# Patient Record
Sex: FEMALE | Race: BLACK OR AFRICAN AMERICAN | Employment: PART TIME | URBAN - METROPOLITAN AREA
[De-identification: names, ages, dates, MRNs, and addresses within clinical notes are randomized per-mention and may not be internally consistent; named-entity substitution may affect disease eponyms.]

---

## 2021-04-05 ENCOUNTER — VIRTUAL VISIT (OUTPATIENT)
Dept: FAMILY MEDICINE CLINIC | Age: 61
End: 2021-04-05
Payer: MEDICAID

## 2021-04-05 DIAGNOSIS — M25.561 CHRONIC PAIN OF BOTH KNEES: Primary | ICD-10-CM

## 2021-04-05 DIAGNOSIS — M79.672 PAIN IN BOTH FEET: ICD-10-CM

## 2021-04-05 DIAGNOSIS — Z12.11 COLON CANCER SCREENING: ICD-10-CM

## 2021-04-05 DIAGNOSIS — Z12.31 ENCOUNTER FOR SCREENING MAMMOGRAM FOR MALIGNANT NEOPLASM OF BREAST: ICD-10-CM

## 2021-04-05 DIAGNOSIS — M25.562 CHRONIC PAIN OF BOTH KNEES: Primary | ICD-10-CM

## 2021-04-05 DIAGNOSIS — M79.671 PAIN IN BOTH FEET: ICD-10-CM

## 2021-04-05 DIAGNOSIS — G89.29 CHRONIC PAIN OF BOTH KNEES: Primary | ICD-10-CM

## 2021-04-05 PROCEDURE — 99213 OFFICE O/P EST LOW 20 MIN: CPT | Performed by: FAMILY MEDICINE

## 2021-04-05 RX ORDER — NAPROXEN 500 MG/1
TABLET ORAL
COMMUNITY
Start: 2021-02-23 | End: 2021-04-05 | Stop reason: ALTCHOICE

## 2021-04-05 RX ORDER — DICLOFENAC SODIUM 50 MG/1
50 TABLET, DELAYED RELEASE ORAL 2 TIMES DAILY
Qty: 60 TAB | Refills: 1 | Status: SHIPPED | OUTPATIENT
Start: 2021-04-05 | End: 2021-05-24 | Stop reason: SDUPTHER

## 2021-04-05 NOTE — LETTER
NOTIFICATION RETURN TO WORK 
 
4/5/2021 1:55 PM 
 
Ms. Chao Rosales [address] [place] [address] To Whom It May Concern: 
 
Chao Rosales is currently under the care of [address]. She will need to have a shorter walk to work because of her knee pain. If there are questions or concerns please have the patient contact our office. Sincerely, Brianda Keane MD

## 2021-05-07 ENCOUNTER — OFFICE VISIT (OUTPATIENT)
Dept: ORTHOPEDIC SURGERY | Age: 61
End: 2021-05-07
Payer: MEDICAID

## 2021-05-07 VITALS — WEIGHT: 220 LBS | HEIGHT: 61 IN | BODY MASS INDEX: 41.54 KG/M2

## 2021-05-07 DIAGNOSIS — M25.561 CHRONIC PAIN OF RIGHT KNEE: Primary | ICD-10-CM

## 2021-05-07 DIAGNOSIS — G89.29 CHRONIC PAIN OF RIGHT KNEE: Primary | ICD-10-CM

## 2021-05-07 DIAGNOSIS — M25.562 CHRONIC PAIN OF LEFT KNEE: ICD-10-CM

## 2021-05-07 DIAGNOSIS — G89.29 CHRONIC PAIN OF LEFT KNEE: ICD-10-CM

## 2021-05-07 DIAGNOSIS — M17.0 PRIMARY OSTEOARTHRITIS OF BOTH KNEES: ICD-10-CM

## 2021-05-07 PROCEDURE — 73564 X-RAY EXAM KNEE 4 OR MORE: CPT | Performed by: ORTHOPAEDIC SURGERY

## 2021-05-07 PROCEDURE — 99204 OFFICE O/P NEW MOD 45 MIN: CPT | Performed by: ORTHOPAEDIC SURGERY

## 2021-05-07 RX ORDER — ASPIRIN 300 MG
SUPPOSITORY, RECTAL RECTAL
COMMUNITY
End: 2022-04-11 | Stop reason: ALTCHOICE

## 2021-05-07 NOTE — PROGRESS NOTES
Patient: Linda Serrano                MRN: [id number]       SSN: [id number]  YOB: 1960        AGE: 63 y.o. SEX: female  Body mass index is 42.26 kg/m². PCP: Hawa Dawn MD  05/07/21    Shanna Smiley presents with bilateral knee pain worse on the left and on the right injections usually only last about 3 to 4 weeks and then they tend to attenuate. Gets pain at night pain with activities of daily living she stands on her feet most of the day for work up to 8 hours at a time but her boss does letter to set we will write her a note to confirm this as well night pain difficulties with stairs kneeling getting up and down from a chair the left knee is more painful than the right 1. She denies fevers or chills and otherwise has been feeling well    The examination at today she stands in bilateral varus worse on the left and on the right she is about 15 degrees on the left with a lateral thrust 4 degree fixed flexion deformity on the left 3 degrees on the right bends fairly well to 115 118 degrees both feet are warm well perfused she has truncal obesity her legs are actually quite accessible and her body mass index is at 42 today    Joint line tenderness in all 3 compartments worse medially and patellofemoral in a mild to moderate effusion but not severely so    X-rays today on 5/7/2021 AP tunnel lateral and skyline of both knees confirm severe end-stage arthritis really bilaterally.     Was a discussion regarding surgery today and I would recommended after about a 15 pound weight loss to have the satisfy the hospital BMI criteria under 40    Risks and benefits described including but not limited to infection DVT pulmonary embolism anesthetic complications blood loss requiring transfusion chronic pain instability implant longevity arthrofibrosis and also the need for bending and straightening knee on an hourly basis to avoid complications    Is been a pleasure to share in her care and I do look forward to helping her surgically    REVIEW OF SYSTEMS:      CON: negative  EYE: negative   ENT: negative  RESP: negative  GI:    negative   :  negative  MSK: Positive  A twelve point review of systems was completed, positives noted and all other systems were reviewed and are negative          Past Medical History:   Diagnosis Date    Arthritis     Hypercholesteremia     SOB (shortness of breath) on exertion        History reviewed. No pertinent family history. Current Outpatient Medications   Medication Sig Dispense Refill    Aspirin-Caffeine (BC Arthritis) 1,000-65 mg pwpk Take  by mouth.  diclofenac EC (VOLTAREN) 50 mg EC tablet Take 1 Tab by mouth two (2) times a day.  61 Tab 1       No Known Allergies    Past Surgical History:   Procedure Laterality Date    VA EXPLORATORY OF ABDOMEN      gallstone removal       Social History     Socioeconomic History    Marital status: UNKNOWN     Spouse name: Not on file    Number of children: Not on file    Years of education: Not on file    Highest education level: Not on file   Occupational History    Not on file   Social Needs    Financial resource strain: Not on file    Food insecurity     Worry: Not on file     Inability: Not on file    Transportation needs     Medical: Not on file     Non-medical: Not on file   Tobacco Use    Smoking status: Never Smoker    Smokeless tobacco: Never Used   Substance and Sexual Activity    Alcohol use: Never     Frequency: Never     Binge frequency: Never    Drug use: Never    Sexual activity: Not on file   Lifestyle    Physical activity     Days per week: Not on file     Minutes per session: Not on file    Stress: Not on file   Relationships    Social connections     Talks on phone: Not on file     Gets together: Not on file     Attends Mandaen service: Not on file     Active member of club or organization: Not on file     Attends meetings of clubs or organizations: Not on file     Relationship status: Not on file    Intimate partner violence     Fear of current or ex partner: Not on file     Emotionally abused: Not on file     Physically abused: Not on file     Forced sexual activity: Not on file   Other Topics Concern    Not on file   Social History Narrative    Not on file       Visit Vitals  Ht 5' 0.5\" (1.537 m)   Wt 220 lb (99.8 kg)   BMI 42.26 kg/m²         PHYSICAL EXAMINATION:  GENERAL: Alert and oriented x3, in no acute distress, well-developed, well-nourished, afebrile. HEART: No JVD. EYES: No scleral icterus   NECK: No significant lymphadenopathy   LUNGS: No respiratory compromise or indrawing  ABDOMEN: Soft, non-tender, non-distended. Electronically signed by:  Kathi Emanuel MD

## 2021-05-07 NOTE — LETTER
NOTIFICATION RETURN TO WORK / SCHOOL 
 
5/7/2021 3:57 PM 
 
Ms. Yohana Anderson [address] [phone] To Whom It May Concern: 
 
Yohana Anderson is currently under the care of [address] [place], [place] [address]otilia Holden. Due to Ms. Munroe's medical condition, she should be allowed to sit as needed and use the front entrance when coming to work. If there are questions or concerns please have the patient contact our office. Sincerely, Amanda Bills MD

## 2021-05-07 NOTE — LETTER
5/7/2021 3:47 PM 
 
Ms. Solomon Ba [address] [place] [address] To Whom It May Concern: 
 
Solomon Ba is currently under the care of [address] [place] [address]. Due to Ms. Munroe's medical condition, she should be allowed to sit as needed. If there are questions or concerns please have the patient contact our office. Sincerely, Jaime Mcrae MD

## 2021-05-24 DIAGNOSIS — G89.29 CHRONIC PAIN OF BOTH KNEES: ICD-10-CM

## 2021-05-24 DIAGNOSIS — M25.561 CHRONIC PAIN OF BOTH KNEES: ICD-10-CM

## 2021-05-24 DIAGNOSIS — M25.562 CHRONIC PAIN OF BOTH KNEES: ICD-10-CM

## 2021-05-24 RX ORDER — DICLOFENAC SODIUM 50 MG/1
50 TABLET, DELAYED RELEASE ORAL 2 TIMES DAILY
Qty: 60 TABLET | Refills: 1 | Status: SHIPPED | OUTPATIENT
Start: 2021-05-24 | End: 2021-07-27 | Stop reason: SDUPTHER

## 2021-05-24 NOTE — TELEPHONE ENCOUNTER
Patient called the office stating she only has two pills left of her pain medication and is asking if this can be sent in for her. Patient would like a return call once this has been addressed.

## 2021-05-25 ENCOUNTER — TELEPHONE (OUTPATIENT)
Dept: FAMILY MEDICINE CLINIC | Age: 61
End: 2021-05-25

## 2021-05-25 NOTE — TELEPHONE ENCOUNTER
Patient called again to check the status. Informed her that more than likely it will not be done today, but I would send another message. She said she needs it Thursday before she goes to work. Please return call to [phone].

## 2021-05-25 NOTE — TELEPHONE ENCOUNTER
Patient called to check on the status. I let her know Dr. Myesha Rodriguez had approved the note for 5/24, 5/25, and 5/26. This was written and attempted to be faxed to the # requested [phone], attn: Catie Francisco per the patient request. The fax # above is not working. I tried to call patient back to make her aware but no answer and voicemail is not set up.

## 2021-05-25 NOTE — TELEPHONE ENCOUNTER
Patient called stating her knees are hurting, she can barley stand, she did not go to work Monday 05/24/21 today 05/25/21 and she states she is not going in on Wed 05/26/21, until she get the medication in her system that was  prescribed for her on yesterday. Patient would like a work note for those days, she states she will return on Thursday 05/27/21. Please Fax work note [phone].

## 2021-07-27 ENCOUNTER — VIRTUAL VISIT (OUTPATIENT)
Dept: FAMILY MEDICINE CLINIC | Age: 61
End: 2021-07-27
Payer: MEDICAID

## 2021-07-27 ENCOUNTER — TELEPHONE (OUTPATIENT)
Dept: FAMILY MEDICINE CLINIC | Age: 61
End: 2021-07-27

## 2021-07-27 DIAGNOSIS — M25.562 CHRONIC PAIN OF BOTH KNEES: ICD-10-CM

## 2021-07-27 DIAGNOSIS — M25.561 CHRONIC PAIN OF BOTH KNEES: ICD-10-CM

## 2021-07-27 DIAGNOSIS — G89.29 CHRONIC PAIN OF BOTH KNEES: ICD-10-CM

## 2021-07-27 PROCEDURE — 99442 PR PHYS/QHP TELEPHONE EVALUATION 11-20 MIN: CPT | Performed by: FAMILY MEDICINE

## 2021-07-27 RX ORDER — DICLOFENAC SODIUM 50 MG/1
50 TABLET, DELAYED RELEASE ORAL 2 TIMES DAILY
Qty: 60 TABLET | Refills: 3 | Status: SHIPPED | OUTPATIENT
Start: 2021-07-27 | End: 2022-02-07 | Stop reason: SDUPTHER

## 2021-07-27 NOTE — PROGRESS NOTES
Erin Mitchell is a 63 y.o. female, evaluated via audio-only technology on 7/27/2021 for Knee Pain (8/10)  . Assessment & Plan:   Diagnoses and all orders for this visit:    1. Chronic pain of both knees  -     diclofenac EC (VOLTAREN) 50 mg EC tablet; Take 1 Tablet by mouth two (2) times a day. Work note written. 12  Subjective:       Prior to Admission medications    Medication Sig Start Date End Date Taking? Authorizing Provider   diclofenac EC (VOLTAREN) 50 mg EC tablet Take 1 Tablet by mouth two (2) times a day. 5/24/21   Heidi Roberto MD   Aspirin-Caffeine [facility] Arthritis) 1,000-65 mg pwpk Take  by mouth. Provider, Historical     There is no problem list on file for this patient. Past Medical History:   Diagnosis Date    Arthritis     Hypercholesteremia     SOB (shortness of breath) on exertion        Review of Systems   Constitutional: Negative for chills, fever, malaise/fatigue and weight loss. Eyes: Negative for blurred vision. Respiratory: Negative for cough, shortness of breath and wheezing. Cardiovascular: Negative for chest pain. Gastrointestinal: Negative for nausea and vomiting. Musculoskeletal: Positive for joint pain. Negative for myalgias. Skin: Negative for rash. Neurological: Negative for weakness. Psychiatric/Behavioral: Negative. No flowsheet data found. Erin Mitchell, who was evaluated through a patient-initiated, synchronous (real-time) audio only encounter, and/or her healthcare decision maker, is aware that it is a billable service, with coverage as determined by her insurance carrier. She provided verbal consent to proceed: Yes. She has not had a related appointment within my department in the past 7 days or scheduled within the next 24 hours.       Total Time: minutes: 11-20 minutes    Tahmina Orlando MD

## 2021-07-27 NOTE — LETTER
NOTIFICATION RETURN TO WORK     7/27/2021 1:16 PM    [facility] Marybeth Warren  [address] [facility] [address]  [address] [place] [id number]      To Whom It May Concern:    Marybeth Warren is currently under the care of [address]. She will return to work on 7/28/21. If there are questions or concerns please have the patient contact our office.         Sincerely,      Lilian Espana MD

## 2022-02-07 ENCOUNTER — OFFICE VISIT (OUTPATIENT)
Dept: FAMILY MEDICINE CLINIC | Age: 62
End: 2022-02-07
Payer: MEDICAID

## 2022-02-07 ENCOUNTER — HOSPITAL ENCOUNTER (OUTPATIENT)
Dept: LAB | Age: 62
Discharge: HOME | End: 2022-02-07

## 2022-02-07 VITALS
HEART RATE: 97 BPM | BODY MASS INDEX: 42.26 KG/M2 | OXYGEN SATURATION: 100 % | RESPIRATION RATE: 10 BRPM | SYSTOLIC BLOOD PRESSURE: 138 MMHG | WEIGHT: 220 LBS | DIASTOLIC BLOOD PRESSURE: 82 MMHG

## 2022-02-07 DIAGNOSIS — M79.671 PAIN IN BOTH FEET: ICD-10-CM

## 2022-02-07 DIAGNOSIS — M25.562 CHRONIC PAIN OF BOTH KNEES: Primary | ICD-10-CM

## 2022-02-07 DIAGNOSIS — M79.672 PAIN IN BOTH FEET: ICD-10-CM

## 2022-02-07 DIAGNOSIS — G89.29 CHRONIC PAIN OF BOTH KNEES: Primary | ICD-10-CM

## 2022-02-07 DIAGNOSIS — M25.561 CHRONIC PAIN OF BOTH KNEES: Primary | ICD-10-CM

## 2022-02-07 DIAGNOSIS — Z12.11 COLON CANCER SCREENING: ICD-10-CM

## 2022-02-07 LAB — XX-LABCORP SPECIMEN COL,LCBCF: NORMAL

## 2022-02-07 PROCEDURE — 99001 SPECIMEN HANDLING PT-LAB: CPT

## 2022-02-07 PROCEDURE — 99214 OFFICE O/P EST MOD 30 MIN: CPT | Performed by: FAMILY MEDICINE

## 2022-02-07 RX ORDER — DICLOFENAC SODIUM 50 MG/1
50 TABLET, DELAYED RELEASE ORAL 2 TIMES DAILY
Qty: 60 TABLET | Refills: 3 | Status: SHIPPED | OUTPATIENT
Start: 2022-02-07 | End: 2022-04-11 | Stop reason: ALTCHOICE

## 2022-02-07 RX ORDER — DICLOFENAC SODIUM 50 MG/1
50 TABLET, DELAYED RELEASE ORAL 2 TIMES DAILY
Qty: 60 TABLET | Refills: 3 | Status: CANCELLED | OUTPATIENT
Start: 2022-02-07

## 2022-02-07 NOTE — PATIENT INSTRUCTIONS
Colon Cancer Screening: Care Instructions  Overview     Colorectal cancer occurs in the colon or rectum. That's the lower part of your digestive system. It often starts in small growths called polyps in the colon or rectum. Polyps are usually found with screening tests. Depending on the type of test, any polyps found may be removed during the tests. Colorectal cancer usually does not cause symptoms at first. But regular tests can help find it early, before it spreads and becomes harder to treat. Your risk for colorectal cancer gets higher as you get older. Experts recommend starting screening at age 39 for people who are at average risk. Talk with your doctor about your risk and when to start and stop screening. You may have one of several tests. Follow-up care is a key part of your treatment and safety. Be sure to make and go to all appointments, and call your doctor if you are having problems. It's also a good idea to know your test results and keep a list of the medicines you take. What are the main screening tests for colon cancer? The screening tests are:  Stool tests. These include the guaiac fecal occult blood test (gFOBT), the fecal immunochemical test (FIT), and the combined fecal immunochemical test and stool DNA test (FIT-DNA). These tests check stool samples for signs of cancer. If your test is positive, you will need to have a colonoscopy. Sigmoidoscopy. This test lets your doctor look at the lining of your rectum and the lowest part of your colon. Your doctor uses a lighted tube called a sigmoidoscope. This test can't find cancers or polyps in the upper part of your colon. In some cases, polyps that are found can be removed. But if your doctor finds polyps, you will need to have a colonoscopy to check the upper part of your colon. Colonoscopy. This test lets your doctor look at the lining of your rectum and your entire colon. The doctor uses a thin, flexible tool called a colonoscope. It can also be used to remove polyps or get a tissue sample (biopsy). A less common test is CT colonography (CTC). It's also called virtual colonoscopy. Who should be screened for colorectal cancer? Your risk for colorectal cancer gets higher as you get older. Experts recommend starting screening at age 39 for people who are at average risk. Talk with your doctor about your risk and when to start and stop screening. How often you need screening depends on the type of test you get:  Stool tests. Every year for FIT or gFOBT. Every 1 to 3 years for sDNA, also called FIT-DNA. Tests that look inside the colon. Every 5 years for sigmoidoscopy. (If you do the FIT test every year, you can get this test every 10 years.)   Every 5 years for CT colonography (virtual colonoscopy). Every 10 years for colonoscopy. Experts agree that people at higher risk may need to be tested sooner and more often. This includes people who have a strong family history of colon cancer. Talk to your doctor about which test is best for you and when to be tested. When should you call for help? Watch closely for changes in your health, and be sure to contact your doctor if:    · You have any changes in your bowel habits.     · You have any problems. Where can you learn more? Go to http://www.gray.com/  Enter M541 in the search box to learn more about \"Colon Cancer Screening: Care Instructions. \"  Current as of: December 17, 2020               Content Version: 13.0  © 1456-6739 [facility]. Care instructions adapted under license by [employer] (which disclaims liability or warranty for this information). If you have questions about a medical condition or this instruction, always ask your healthcare professional. [place] [address] any warranty or liability for your use of this information.

## 2022-02-07 NOTE — PROGRESS NOTES
Gila Cortés is a 57 y.o. female  presents for joint pain. No injury or trauma. She has assoc swelling. No fever chills nausea or vomiting. sxs are getting worse. No Known Allergies  Outpatient Medications Marked as Taking for the 2/7/22 encounter (Office Visit) with Carolann Adamson MD   Medication Sig Dispense Refill    diclofenac EC (VOLTAREN) 50 mg EC tablet Take 1 Tablet by mouth two (2) times a day. 60 Tablet 3     There is no problem list on file for this patient. Past Medical History:   Diagnosis Date    Arthritis     Hypercholesteremia     SOB (shortness of breath) on exertion      Social History     Socioeconomic History    Marital status: UNKNOWN   Tobacco Use    Smoking status: Never Smoker    Smokeless tobacco: Never Used   Substance and Sexual Activity    Alcohol use: Never    Drug use: Never     No family history on file. Review of Systems   Constitutional: Negative for chills, fever, malaise/fatigue and weight loss. Eyes: Negative for blurred vision. Respiratory: Negative for cough, shortness of breath and wheezing. Cardiovascular: Negative for chest pain. Gastrointestinal: Negative for nausea and vomiting. Musculoskeletal: Positive for joint pain. Negative for myalgias. Skin: Negative for rash. Neurological: Negative for weakness. Psychiatric/Behavioral: Negative. Vitals:    02/07/22 1407   BP: 138/82   Pulse: 97   Resp: 10   SpO2: 100%   Weight: 220 lb (99.8 kg)   PainSc:  10 - Worst pain ever   PainLoc: Hand       Physical Exam  Vitals and nursing note reviewed. Constitutional:       Appearance: Normal appearance. She is obese. Neck:      Thyroid: No thyromegaly. Cardiovascular:      Rate and Rhythm: Normal rate and regular rhythm. Heart sounds: Normal heart sounds. Pulmonary:      Effort: Pulmonary effort is normal.      Breath sounds: Normal breath sounds. Musculoskeletal:         General: Swelling and tenderness present.  Normal range of motion. Cervical back: Normal range of motion and neck supple. Right lower leg: No edema. Left lower leg: No edema. Skin:     General: Skin is warm and dry. Neurological:      General: No focal deficit present. Mental Status: She is alert and oriented to person, place, and time. Psychiatric:         Mood and Affect: Mood normal.         Behavior: Behavior normal.         Thought Content: Thought content normal.         Judgment: Judgment normal.         Assessment/Plan      ICD-10-CM ICD-9-CM    1. Chronic pain of both knees  M25.561 719.46 diclofenac EC (VOLTAREN) 50 mg EC tablet    M25.562 338.29 VITAMIN D, 25 HYDROXY    X05.90  METABOLIC PANEL, COMPREHENSIVE      CBC WITH AUTOMATED DIFF      URIC ACID      RHEUMATOID FACTOR, QT   2. Colon cancer screening  Z12.11 V76.51 REFERRAL TO GASTROENTEROLOGY   3. Pain in both feet  M79.671 729.5 REFERRAL TO PODIATRY    F88.740        I have discussed the diagnosis with the patient and the intended plan of care as seen in the above orders. The patient has received an after-visit summary and questions were answered concerning future plans. I have discussed medication, side effects, and warnings with the patient in detail. The patient verbalized understanding and is in agreement with the plan of care. The patient will contact the office with any additional concerns.       lab results and schedule of future lab studies reviewed with patient    Ange Menjivar MD

## 2022-02-12 LAB
25(OH)D3+25(OH)D2 SERPL-MCNC: 11.5 NG/ML (ref 30–100)
ALBUMIN SERPL-MCNC: 4 G/DL (ref 3.8–4.8)
ALBUMIN/GLOB SERPL: 1.3 {RATIO} (ref 1.2–2.2)
ALP SERPL-CCNC: 106 IU/L (ref 44–121)
ALT SERPL-CCNC: 17 IU/L (ref 0–32)
AST SERPL-CCNC: 23 IU/L (ref 0–40)
BASOPHILS # BLD AUTO: 0 X10E3/UL (ref 0–0.2)
BASOPHILS NFR BLD AUTO: 0 %
BILIRUB SERPL-MCNC: 0.2 MG/DL (ref 0–1.2)
BUN SERPL-MCNC: 11 MG/DL (ref 8–27)
BUN/CREAT SERPL: 16 (ref 12–28)
CALCIUM SERPL-MCNC: 9.5 MG/DL (ref 8.7–10.3)
CHLORIDE SERPL-SCNC: 103 MMOL/L (ref 96–106)
CO2 SERPL-SCNC: 19 MMOL/L (ref 20–29)
CREAT SERPL-MCNC: 0.68 MG/DL (ref 0.57–1)
EOSINOPHIL # BLD AUTO: 0.2 X10E3/UL (ref 0–0.4)
EOSINOPHIL NFR BLD AUTO: 3 %
ERYTHROCYTE [DISTWIDTH] IN BLOOD BY AUTOMATED COUNT: 12.6 % (ref 11.7–15.4)
GLOBULIN SER CALC-MCNC: 3 G/DL (ref 1.5–4.5)
GLUCOSE SERPL-MCNC: 75 MG/DL (ref 65–99)
HCT VFR BLD AUTO: 40.7 % (ref 34–46.6)
HGB BLD-MCNC: 13.2 G/DL (ref 11.1–15.9)
IMM GRANULOCYTES # BLD AUTO: 0 X10E3/UL (ref 0–0.1)
IMM GRANULOCYTES NFR BLD AUTO: 0 %
LYMPHOCYTES # BLD AUTO: 2.4 X10E3/UL (ref 0.7–3.1)
LYMPHOCYTES NFR BLD AUTO: 36 %
MCH RBC QN AUTO: 27.2 PG (ref 26.6–33)
MCHC RBC AUTO-ENTMCNC: 32.4 G/DL (ref 31.5–35.7)
MCV RBC AUTO: 84 FL (ref 79–97)
MONOCYTES # BLD AUTO: 0.4 X10E3/UL (ref 0.1–0.9)
MONOCYTES NFR BLD AUTO: 5 %
NEUTROPHILS # BLD AUTO: 3.8 X10E3/UL (ref 1.4–7)
NEUTROPHILS NFR BLD AUTO: 56 %
PLATELET # BLD AUTO: 281 X10E3/UL (ref 150–450)
POTASSIUM SERPL-SCNC: 4.6 MMOL/L (ref 3.5–5.2)
PROT SERPL-MCNC: 7 G/DL (ref 6–8.5)
RBC # BLD AUTO: 4.85 X10E6/UL (ref 3.77–5.28)
RHEUMATOID FACT SERPL-ACNC: <10 IU/ML (ref 0–15)
SODIUM SERPL-SCNC: 140 MMOL/L (ref 134–144)
SPECIMEN STATUS REPORT, ROLRST: NORMAL
URATE SERPL-MCNC: 5.9 MG/DL (ref 3–7.2)
WBC # BLD AUTO: 6.8 X10E3/UL (ref 3.4–10.8)

## 2022-02-15 ENCOUNTER — VIRTUAL VISIT (OUTPATIENT)
Dept: FAMILY MEDICINE CLINIC | Age: 62
End: 2022-02-15
Payer: MEDICAID

## 2022-02-15 DIAGNOSIS — G89.29 CHRONIC PAIN OF BOTH KNEES: Primary | ICD-10-CM

## 2022-02-15 DIAGNOSIS — M25.562 CHRONIC PAIN OF BOTH KNEES: Primary | ICD-10-CM

## 2022-02-15 DIAGNOSIS — M25.561 CHRONIC PAIN OF BOTH KNEES: Primary | ICD-10-CM

## 2022-02-15 PROCEDURE — 99213 OFFICE O/P EST LOW 20 MIN: CPT | Performed by: FAMILY MEDICINE

## 2022-02-15 NOTE — LETTER
NOTIFICATION RETURN TO WORK / SCHOOL    2/15/2022 2:24 PM    [facility] Taz Melgar  [address] [facility] [address]  [address] [address]      To Whom It May Concern:    Taz Melgar is currently under the care of [address]. She was out of work on 2/14/22 to 2/16/22. She will return to work on 2/17/22. If there are questions or concerns please have the patient contact our office.         Sincerely,      Irena Deleon MD

## 2022-02-15 NOTE — PROGRESS NOTES
Chief Complaint   Patient presents with    Leg Pain    Leg Swelling     Swelling and pain in both legs. Describes the pain as sharp.

## 2022-02-15 NOTE — PROGRESS NOTES
Fabien Connolly is a 57 y.o. female who was seen by synchronous (real-time) audio-video technology on 2/15/2022 for Leg Pain (she has pain in both knees right is worse than left. No injury or trauma.  ) and Leg Swelling        Assessment & Plan:   Diagnoses and all orders for this visit:    1. Chronic pain of both knees      Will continue current meds and follow up in 2 months. Work note faxed. [phone]  Subjective:       Prior to Admission medications    Medication Sig Start Date End Date Taking? Authorizing Provider   diclofenac EC (VOLTAREN) 50 mg EC tablet Take 1 Tablet by mouth two (2) times a day. 2/7/22   Severiano Commander, MD   Aspirin-Caffeine [facility] [place] Arthritis) 1,000-65 mg pwpk Take  by mouth. Provider, Historical     There is no problem list on file for this patient. Past Medical History:   Diagnosis Date    Arthritis     Hypercholesteremia     SOB (shortness of breath) on exertion        Review of Systems   Constitutional: Negative for chills, fever, malaise/fatigue and weight loss. Eyes: Negative for blurred vision. Respiratory: Negative for cough, shortness of breath and wheezing. Cardiovascular: Negative for chest pain. Gastrointestinal: Negative for nausea and vomiting. Musculoskeletal: Positive for joint pain. Negative for myalgias. Skin: Negative for rash. Neurological: Negative for weakness. Psychiatric/Behavioral: Negative. Objective:   No flowsheet data found. General: alert, cooperative, no distress   Mental  status: normal mood, behavior, speech, dress, motor activity, and thought processes, able to follow commands   HENT: NCAT   Neck: no visualized mass   Resp: no respiratory distress   Neuro: no gross deficits   Skin: no discoloration or lesions of concern on visible areas   Psychiatric: normal affect, consistent with stated mood, no evidence of hallucinations     Additional exam findings:        We discussed the expected course, resolution and complications of the diagnosis(es) in detail. Medication risks, benefits, costs, interactions, and alternatives were discussed as indicated. I advised her to contact the office if her condition worsens, changes or fails to improve as anticipated. She expressed understanding with the diagnosis(es) and plan. [place] Schlatter, was evaluated through a synchronous (real-time) audio-video encounter. The patient (or guardian if applicable) is aware that this is a billable service, which includes applicable co-pays. Verbal consent to proceed has been obtained. The visit was conducted pursuant to the emergency declaration under the [facility], [address] [place] [address] [facility] authority and the [email or web address] and [employer] General Act. Patient identification was verified, and a caregiver was present when appropriate. The patient was located at home in a state where the provider was licensed to provide care.     Stephen Silveira MD

## 2022-02-16 DIAGNOSIS — E55.9 VITAMIN D DEFICIENCY: Primary | ICD-10-CM

## 2022-02-16 RX ORDER — ERGOCALCIFEROL 1.25 MG/1
50000 CAPSULE ORAL
Qty: 12 CAPSULE | Refills: 1 | Status: SHIPPED | OUTPATIENT
Start: 2022-02-16

## 2022-03-21 ENCOUNTER — OFFICE VISIT (OUTPATIENT)
Dept: FAMILY MEDICINE CLINIC | Age: 62
End: 2022-03-21
Payer: MEDICAID

## 2022-03-21 VITALS
HEART RATE: 79 BPM | SYSTOLIC BLOOD PRESSURE: 134 MMHG | RESPIRATION RATE: 8 BRPM | WEIGHT: 220 LBS | BODY MASS INDEX: 42.26 KG/M2 | OXYGEN SATURATION: 97 % | DIASTOLIC BLOOD PRESSURE: 78 MMHG

## 2022-03-21 DIAGNOSIS — M25.561 ACUTE PAIN OF RIGHT KNEE: Primary | ICD-10-CM

## 2022-03-21 PROCEDURE — 99213 OFFICE O/P EST LOW 20 MIN: CPT | Performed by: FAMILY MEDICINE

## 2022-03-21 NOTE — PROGRESS NOTES
Chief Complaint   Patient presents with    Leg Pain     Pt in office for bilat leg pain. States that right leg is worse and that it feels swollen behind her knee.

## 2022-03-21 NOTE — PATIENT INSTRUCTIONS
Knee Pain or Injury: Care Instructions  Your Care Instructions     Injuries are a common cause of knee problems. Sudden (acute) injuries may be caused by a direct blow to the knee. They can also be caused by abnormal twisting, bending, or falling on the knee. Pain, bruising, or swelling may be severe, and may start within minutes of the injury. Overuse is another cause of knee pain. Other causes are climbing stairs, kneeling, and other activities that use the knee. Everyday wear and tear, especially as you get older, also can cause knee pain. Rest, along with home treatment, often relieves pain and allows your knee to heal. If you have a serious knee injury, you may need tests and treatment. Follow-up care is a key part of your treatment and safety. Be sure to make and go to all appointments, and call your doctor if you are having problems. It's also a good idea to know your test results and keep a list of the medicines you take. How can you care for yourself at home? · Be safe with medicines. Read and follow all instructions on the label. ? If the doctor gave you a prescription medicine for pain, take it as prescribed. ? If you are not taking a prescription pain medicine, ask your doctor if you can take an over-the-counter medicine. · Rest and protect your knee. Take a break from any activity that may cause pain. · Put ice or a cold pack on your knee for 10 to 20 minutes at a time. Put a thin cloth between the ice and your skin. · Prop up a sore knee on a pillow when you ice it or anytime you sit or lie down for the next 3 days. Try to keep it above the level of your heart. This will help reduce swelling. · If your knee is not swollen, you can put moist heat, a heating pad, or a warm cloth on your knee. · If your doctor recommends an elastic bandage, sleeve, or other type of support for your knee, wear it as directed.   · Follow your doctor's instructions about how much weight you can put on your leg. Use a cane, crutches, or a walker as instructed. · Follow your doctor's instructions about activity during your healing process. If you can do mild exercise, slowly increase your activity. · Reach and stay at a healthy weight. Extra weight can strain the joints, especially the knees and hips, and make the pain worse. Losing even a few pounds may help. When should you call for help? Call 911 anytime you think you may need emergency care. For example, call if:    · You have symptoms of a blood clot in your lung (called a pulmonary embolism). These may include:  ? Sudden chest pain. ? Trouble breathing. ? Coughing up blood. Call your doctor now or seek immediate medical care if:    · You have severe or increasing pain.     · Your leg or foot turns cold or changes color.     · You cannot stand or put weight on your knee.     · Your knee looks twisted or bent out of shape.     · You cannot move your knee.     · You have signs of infection, such as:  ? Increased pain, swelling, warmth, or redness. ? Red streaks leading from the knee. ? Pus draining from a place on your knee. ? A fever.     · You have signs of a blood clot in your leg (called a deep vein thrombosis), such as:  ? Pain in your calf, back of the knee, thigh, or groin. ? Redness and swelling in your leg or groin. Watch closely for changes in your health, and be sure to contact your doctor if:    · You have tingling, weakness, or numbness in your knee.     · You have any new symptoms, such as swelling.     · You have bruises from a knee injury that last longer than 2 weeks.     · You do not get better as expected. Where can you learn more? Go to http://www.gray.com/  Enter K195 in the search box to learn more about \"Knee Pain or Injury: Care Instructions. \"  Current as of: July 1, 2021               Content Version: 13.2  © 4186-0740 Healthwise, [employer].    Care instructions adapted under license by [employer] (which disclaims liability or warranty for this information). If you have questions about a medical condition or this instruction, always ask your healthcare professional. [address] any warranty or liability for your use of this information.

## 2022-03-21 NOTE — PROGRESS NOTES
Deette Opitz is a 57 y.o. female  presents for right knee pain. She has had it for several days. It is not getting worse. No history of injury or trauma. Pain radiates to back of knee. No Known Allergies    There is no problem list on file for this patient. Past Medical History:   Diagnosis Date    Arthritis     Hypercholesteremia     SOB (shortness of breath) on exertion      Social History     Socioeconomic History    Marital status: UNKNOWN   Tobacco Use    Smoking status: Never Smoker    Smokeless tobacco: Never Used   Substance and Sexual Activity    Alcohol use: Never    Drug use: Never     No family history on file. Review of Systems   Constitutional: Negative for chills, fever, malaise/fatigue and weight loss. Eyes: Negative for blurred vision. Respiratory: Negative for cough, shortness of breath and wheezing. Cardiovascular: Negative for chest pain. Gastrointestinal: Negative for nausea and vomiting. Musculoskeletal: Positive for joint pain. Negative for myalgias. Skin: Negative for rash. Neurological: Negative for weakness. Vitals:    03/21/22 1529   BP: 134/78   Pulse: 79   Resp: 8   SpO2: 97%   Weight: 220 lb (99.8 kg)   PainSc:  10 - Worst pain ever   PainLoc: Leg       Physical Exam  Vitals and nursing note reviewed. Constitutional:       Appearance: Normal appearance. She is obese. Neck:      Thyroid: No thyromegaly. Cardiovascular:      Rate and Rhythm: Normal rate and regular rhythm. Pulses: Normal pulses. Heart sounds: Normal heart sounds. Pulmonary:      Effort: Pulmonary effort is normal.      Breath sounds: Normal breath sounds. Musculoskeletal:         General: Tenderness present. No swelling, deformity or signs of injury. Normal range of motion. Cervical back: Normal range of motion and neck supple. Right lower leg: No edema. Left lower leg: No edema. Skin:     General: Skin is warm and dry.    Neurological: General: No focal deficit present. Mental Status: She is alert and oriented to person, place, and time. Assessment/Plan      ICD-10-CM ICD-9-CM    1. Acute pain of right knee  M25.561 719.46 US EXT NONVAS RT LTD     Will continue current voltaren    I have discussed the diagnosis with the patient and the intended plan of care as seen in the above orders. The patient has received an after-visit summary and questions were answered concerning future plans. I have discussed medication, side effects, and warnings with the patient in detail. The patient verbalized understanding and is in agreement with the plan of care. The patient will contact the office with any additional concerns.       Follow-up and Dispositions    · Return if symptoms worsen or fail to improve.       lab results and schedule of future lab studies reviewed with patient    Alex Owens MD

## 2022-03-23 ENCOUNTER — TELEPHONE (OUTPATIENT)
Dept: FAMILY MEDICINE CLINIC | Age: 62
End: 2022-03-23

## 2022-03-23 DIAGNOSIS — R22.41 KNEE MASS, RIGHT: Primary | ICD-10-CM

## 2022-03-23 DIAGNOSIS — M25.561 ACUTE PAIN OF RIGHT KNEE: ICD-10-CM

## 2022-04-08 ENCOUNTER — HOSPITAL ENCOUNTER (OUTPATIENT)
Dept: ULTRASOUND IMAGING | Age: 62
Discharge: HOME | End: 2022-04-08
Attending: FAMILY MEDICINE
Payer: MEDICAID

## 2022-04-08 DIAGNOSIS — M25.561 ACUTE PAIN OF RIGHT KNEE: ICD-10-CM

## 2022-04-08 DIAGNOSIS — R22.41 KNEE MASS, RIGHT: ICD-10-CM

## 2022-04-08 PROCEDURE — 76882 US LMTD JT/FCL EVL NVASC XTR: CPT

## 2022-04-11 ENCOUNTER — TELEPHONE (OUTPATIENT)
Dept: FAMILY MEDICINE CLINIC | Age: 62
End: 2022-04-11

## 2022-04-11 DIAGNOSIS — M25.562 CHRONIC PAIN OF BOTH KNEES: ICD-10-CM

## 2022-04-11 DIAGNOSIS — M25.561 CHRONIC PAIN OF BOTH KNEES: ICD-10-CM

## 2022-04-11 DIAGNOSIS — R22.41 KNEE MASS, RIGHT: Primary | ICD-10-CM

## 2022-04-11 DIAGNOSIS — M25.561 ACUTE PAIN OF RIGHT KNEE: ICD-10-CM

## 2022-04-11 DIAGNOSIS — G89.29 CHRONIC PAIN OF BOTH KNEES: ICD-10-CM

## 2022-04-11 DIAGNOSIS — M25.569 POSTERIOR KNEE PAIN, UNSPECIFIED LATERALITY: ICD-10-CM

## 2022-04-11 RX ORDER — MELOXICAM 15 MG/1
15 TABLET ORAL DAILY
Qty: 30 TABLET | Refills: 0 | Status: SHIPPED | OUTPATIENT
Start: 2022-04-11 | End: 2022-05-09 | Stop reason: SDUPTHER

## 2022-04-11 NOTE — TELEPHONE ENCOUNTER
Called pt back and she is aware that mobic 15 mg has been sent in to the pharmacy. She is also aware of referral to ortho. Advised her to contact us if she did not hear from DONALD w/in the week. Pt expressed clear understanding and had no further questions.

## 2022-05-09 ENCOUNTER — OFFICE VISIT (OUTPATIENT)
Dept: FAMILY MEDICINE CLINIC | Age: 62
End: 2022-05-09
Payer: MEDICAID

## 2022-05-09 VITALS
OXYGEN SATURATION: 98 % | WEIGHT: 220 LBS | TEMPERATURE: 97.8 F | HEART RATE: 68 BPM | SYSTOLIC BLOOD PRESSURE: 124 MMHG | DIASTOLIC BLOOD PRESSURE: 78 MMHG | BODY MASS INDEX: 42.26 KG/M2 | RESPIRATION RATE: 14 BRPM

## 2022-05-09 DIAGNOSIS — M25.561 CHRONIC PAIN OF BOTH KNEES: Primary | ICD-10-CM

## 2022-05-09 DIAGNOSIS — G89.29 CHRONIC PAIN OF BOTH KNEES: Primary | ICD-10-CM

## 2022-05-09 DIAGNOSIS — M25.562 CHRONIC PAIN OF BOTH KNEES: Primary | ICD-10-CM

## 2022-05-09 PROCEDURE — 99213 OFFICE O/P EST LOW 20 MIN: CPT | Performed by: FAMILY MEDICINE

## 2022-05-09 RX ORDER — LIDOCAINE 4 G/100G
1 PATCH TOPICAL EVERY 12 HOURS
Qty: 30 PATCH | Refills: 2 | Status: SHIPPED
Start: 2022-05-09 | End: 2022-05-23

## 2022-05-09 RX ORDER — MELOXICAM 15 MG/1
15 TABLET ORAL DAILY
Qty: 30 TABLET | Refills: 0 | Status: SHIPPED | OUTPATIENT
Start: 2022-05-09 | End: 2022-10-04 | Stop reason: SDUPTHER

## 2022-05-09 NOTE — PATIENT INSTRUCTIONS
Knee Pain or Injury: Care Instructions  Your Care Instructions     Injuries are a common cause of knee problems. Sudden (acute) injuries may be caused by a direct blow to the knee. They can also be caused by abnormal twisting, bending, or falling on the knee. Pain, bruising, or swelling may be severe, and may start within minutes of the injury. Overuse is another cause of knee pain. Other causes are climbing stairs, kneeling, and other activities that use the knee. Everyday wear and tear, especially as you get older, also can cause knee pain. Rest, along with home treatment, often relieves pain and allows your knee to heal. If you have a serious knee injury, you may need tests and treatment. Follow-up care is a key part of your treatment and safety. Be sure to make and go to all appointments, and call your doctor if you are having problems. It's also a good idea to know your test results and keep a list of the medicines you take. How can you care for yourself at home? · Be safe with medicines. Read and follow all instructions on the label. ? If the doctor gave you a prescription medicine for pain, take it as prescribed. ? If you are not taking a prescription pain medicine, ask your doctor if you can take an over-the-counter medicine. · Rest and protect your knee. Take a break from any activity that may cause pain. · Put ice or a cold pack on your knee for 10 to 20 minutes at a time. Put a thin cloth between the ice and your skin. · Prop up a sore knee on a pillow when you ice it or anytime you sit or lie down for the next 3 days. Try to keep it above the level of your heart. This will help reduce swelling. · If your knee is not swollen, you can put moist heat, a heating pad, or a warm cloth on your knee. · If your doctor recommends an elastic bandage, sleeve, or other type of support for your knee, wear it as directed.   · Follow your doctor's instructions about how much weight you can put on your leg. Use a cane, crutches, or a walker as instructed. · Follow your doctor's instructions about activity during your healing process. If you can do mild exercise, slowly increase your activity. · Reach and stay at a healthy weight. Extra weight can strain the joints, especially the knees and hips, and make the pain worse. Losing even a few pounds may help. When should you call for help? Call 911 anytime you think you may need emergency care. For example, call if:    · You have symptoms of a blood clot in your lung (called a pulmonary embolism). These may include:  ? Sudden chest pain. ? Trouble breathing. ? Coughing up blood. Call your doctor now or seek immediate medical care if:    · You have severe or increasing pain.     · Your leg or foot turns cold or changes color.     · You cannot stand or put weight on your knee.     · Your knee looks twisted or bent out of shape.     · You cannot move your knee.     · You have signs of infection, such as:  ? Increased pain, swelling, warmth, or redness. ? Red streaks leading from the knee. ? Pus draining from a place on your knee. ? A fever.     · You have signs of a blood clot in your leg (called a deep vein thrombosis), such as:  ? Pain in your calf, back of the knee, thigh, or groin. ? Redness and swelling in your leg or groin. Watch closely for changes in your health, and be sure to contact your doctor if:    · You have tingling, weakness, or numbness in your knee.     · You have any new symptoms, such as swelling.     · You have bruises from a knee injury that last longer than 2 weeks.     · You do not get better as expected. Where can you learn more? Go to http://www.gray.com/  Enter K195 in the search box to learn more about \"Knee Pain or Injury: Care Instructions. \"  Current as of: July 1, 2021               Content Version: 13.2  © 2869-1688 Healthwise, [employer].    Care instructions adapted under license by [employer] (which disclaims liability or warranty for this information). If you have questions about a medical condition or this instruction, always ask your healthcare professional. [address] any warranty or liability for your use of this information.

## 2022-05-09 NOTE — PROGRESS NOTES
Patient here for follow up today she says she is still having pain in the back of her right knee and says now it stays swollen all of the time. Says her current pain medication helps some. 1. \"Have you been to the ER, urgent care clinic since your last visit? Hospitalized since your last visit? \" No    2. \"Have you seen or consulted any other health care providers outside of the [address] [place] [address] since your last visit? \" No     3. For patients aged 39-70: Has the patient had a colonoscopy / FIT/ Cologuard? No      If the patient is female:    4. For patients aged 41-77: Has the patient had a mammogram within the past 2 years? No      5. For patients aged 21-65: Has the patient had a pap smear?  No

## 2022-05-09 NOTE — PROGRESS NOTES
Barbara Fernandez is a 57 y.o. female  presents for right knee pain. No new injury or trauma. No weakness or numbness. No Known Allergies  Outpatient Medications Marked as Taking for the 5/9/22 encounter (Office Visit) with Selvin Rowe MD   Medication Sig Dispense Refill    meloxicam (MOBIC) 15 mg tablet Take 1 Tablet by mouth daily. 30 Tablet 0    ergocalciferol (DrisdoL) 1,250 mcg (50,000 unit) capsule Take 1 Capsule by mouth every seven (7) days. 12 Capsule 1     There is no problem list on file for this patient. Past Medical History:   Diagnosis Date    Arthritis     Hypercholesteremia     SOB (shortness of breath) on exertion      Social History     Socioeconomic History    Marital status: SINGLE   Tobacco Use    Smoking status: Never Smoker    Smokeless tobacco: Never Used   Substance and Sexual Activity    Alcohol use: Never    Drug use: Never     No family history on file. Review of Systems   Constitutional: Negative for chills, fever, malaise/fatigue and weight loss. Eyes: Negative for blurred vision. Respiratory: Negative for cough, shortness of breath and wheezing. Cardiovascular: Negative for chest pain. Gastrointestinal: Negative for nausea and vomiting. Musculoskeletal: Positive for joint pain. Negative for myalgias. Skin: Negative for rash. Neurological: Negative for weakness. Psychiatric/Behavioral: Negative. Vitals:    05/09/22 1030   BP: 124/78   Pulse: 68   Resp: 14   Temp: 97.8 °F (36.6 °C)   TempSrc: Tympanic   SpO2: 98%   Weight: 220 lb (99.8 kg)   PainSc:  10 - Worst pain ever   PainLoc: Knee       Physical Exam  Vitals and nursing note reviewed. Constitutional:       Appearance: Normal appearance. She is obese. Neck:      Thyroid: No thyromegaly. Cardiovascular:      Rate and Rhythm: Normal rate and regular rhythm. Pulses: Normal pulses. Heart sounds: Normal heart sounds.    Pulmonary:      Effort: Pulmonary effort is normal. Breath sounds: Normal breath sounds. Musculoskeletal:         General: Tenderness present. No swelling, deformity or signs of injury. Normal range of motion. Cervical back: Normal range of motion and neck supple. Skin:     General: Skin is warm and dry. Neurological:      General: No focal deficit present. Mental Status: She is alert and oriented to person, place, and time. Assessment/Plan      ICD-10-CM ICD-9-CM    1. Chronic pain of both knees  M25.561 719.46 meloxicam (MOBIC) 15 mg tablet    M25.562 338.29 lidocaine 4 % patch    G89.29  CANCELED: REFERRAL TO ORTHOPEDICS     I have discussed the diagnosis with the patient and the intended plan of care as seen in the above orders. The patient has received an after-visit summary and questions were answered concerning future plans. I have discussed medication, side effects, and warnings with the patient in detail. The patient verbalized understanding and is in agreement with the plan of care. The patient will contact the office with any additional concerns.         lab results and schedule of future lab studies reviewed with patient    Shane Robbins MD

## 2022-05-12 ENCOUNTER — TELEPHONE (OUTPATIENT)
Dept: FAMILY MEDICINE CLINIC | Age: 62
End: 2022-05-12

## 2022-05-19 ENCOUNTER — OFFICE VISIT (OUTPATIENT)
Dept: ORTHOPEDIC SURGERY | Age: 62
End: 2022-05-19
Payer: MEDICAID

## 2022-05-19 ENCOUNTER — TELEPHONE (OUTPATIENT)
Dept: FAMILY MEDICINE CLINIC | Age: 62
End: 2022-05-19

## 2022-05-19 VITALS
HEIGHT: 61 IN | WEIGHT: 220 LBS | HEART RATE: 61 BPM | OXYGEN SATURATION: 95 % | TEMPERATURE: 97.4 F | BODY MASS INDEX: 41.54 KG/M2

## 2022-05-19 DIAGNOSIS — G89.29 CHRONIC PAIN OF RIGHT KNEE: ICD-10-CM

## 2022-05-19 DIAGNOSIS — M25.562 CHRONIC PAIN OF BOTH KNEES: Primary | ICD-10-CM

## 2022-05-19 DIAGNOSIS — M25.561 CHRONIC PAIN OF RIGHT KNEE: ICD-10-CM

## 2022-05-19 DIAGNOSIS — G89.29 CHRONIC PAIN OF BOTH KNEES: Primary | ICD-10-CM

## 2022-05-19 DIAGNOSIS — M17.11 PRIMARY OSTEOARTHRITIS OF RIGHT KNEE: Primary | ICD-10-CM

## 2022-05-19 DIAGNOSIS — M25.561 CHRONIC PAIN OF BOTH KNEES: Primary | ICD-10-CM

## 2022-05-19 DIAGNOSIS — M71.21 BAKER CYST, RIGHT: ICD-10-CM

## 2022-05-19 PROCEDURE — 99214 OFFICE O/P EST MOD 30 MIN: CPT | Performed by: SPECIALIST

## 2022-05-19 NOTE — LETTER
5/19/2022 2:10 PM    Ms. Shante Mackey  [address] [id number]         Partial work restrictions for injuries to the  Knee, Leg      PATIENT'S NAME: Shante Mackey   DATE: 5/19/2022     LIFTING:   The patient can lift no more than 25 pounds at a time    CLIMBING:   The patient can climb no more than 2 flights of stairs at a     time. The patient cannot climb ladders    WALKING/STANDING The patient can walk or stand no more than 2 hours at a     time without breaks, and can walk or stand no more than 4     hours at a time without intermittent breaks.      KNEELING/SQUATTING The patient can kneel or squat no more than 30 minutes at     a time    These restrictions are in effect for the above named patient  From: 5-19-22  TO:PERMANENT            Sincerely,        Rosa M Bruce MD

## 2022-05-19 NOTE — TELEPHONE ENCOUNTER
Patient called stating she did not receive her Rx  for lidocaine 4 % patch, however I spoke with Miranda Da Silva in the pharmacy, and he stated that the order was received. Dr Merla Severance sent a order in for 4% that can be purchase over the counter, patient is requesting for him to up the % so she can get it with her insurance. Please review and advise.

## 2022-05-19 NOTE — PROGRESS NOTES
Patient: Henrey Blizzard                MRN: [id number]       SSN: [id number]  YOB: 1960        AGE: 57 y.o. SEX: female    PCP: Imtiaz Barrios MD  05/19/22    Chief Complaint   Patient presents with    Knee Pain     Right     HISTORY:  Henrey Blizzard is a 57 y.o. female who is seen for right knee pain. She has been experiencing right knee pain for the past few years. She does not recall any injury. She feels pain with standing, walking and stair climbing. She experiences startup pain after sitting. She was previously seen by Dr. Gerardo Woo possible surgery. Pain Assessment  5/19/2022   Location of Pain Knee   Location Modifiers Right   Severity of Pain 10   Quality of Pain Throbbing   Quality of Pain Comment -   Duration of Pain Persistent   Frequency of Pain Constant   Aggravating Factors Standing;Bending;Stairs   Limiting Behavior No   Relieving Factors Nothing   Relieving Factors Comment -   Result of Injury No     Occupation, etc:  Ms. Chelsea Gates works in [employer] at [employer]. She's worked various roles at [employer]. She lives with her father in [employer]. She has 2 sons and 1 daughter. She is needle phobic. Ms. Chelsea Gates weighs 220 lbs and is 5'0\" tall. She has no history of diabetes or hypertension. No results found for: HBA1C, HKJ6WQWK, SMS2DNTP, XCT2MEDV  Weight Metrics 5/19/2022 5/9/2022 3/21/2022 2/7/2022 5/7/2021   Weight 220 lb 220 lb 220 lb 220 lb 220 lb   BMI 42.26 kg/m2 42.26 kg/m2 42.26 kg/m2 42.26 kg/m2 42.26 kg/m2       There is no problem list on file for this patient.     REVIEW OF SYSTEMS:    Constitutional Symptoms: Negative   Eyes: Negative   Ears, Nose, Throat and Mouth: Negative   Cardiovascular: Negative   Respiratory: Negative   Genitourinary: Per HPI   Gastrointestinal: Per HPI   Integumentary (Skin and/or Breast): Negative   Musculoskeletal: Per HPI   Endocrine/Rheumatologic: Negative   Neurological: Per HPI   Hematology/Lymphatic: Negative    Allergic/Immunologic: Negative   Phychiatric: Negative    Social History     Socioeconomic History    Marital status: SINGLE     Spouse name: Not on file    Number of children: Not on file    Years of education: Not on file    Highest education level: Not on file   Occupational History    Not on file   Tobacco Use    Smoking status: Never Smoker    Smokeless tobacco: Never Used   Substance and Sexual Activity    Alcohol use: Never    Drug use: Never    Sexual activity: Not on file   Other Topics Concern    Not on file   Social History Narrative    Not on file     Social Determinants of Health     Financial Resource Strain:     Difficulty of Paying Living Expenses: Not on file   Food Insecurity:     Worried About Running Out of Food in the Last Year: Not on file    Gina of Food in the Last Year: Not on file   Transportation Needs:     Lack of Transportation (Medical): Not on file    Lack of Transportation (Non-Medical):  Not on file   Physical Activity:     Days of Exercise per Week: Not on file    Minutes of Exercise per Session: Not on file   Stress:     Feeling of Stress : Not on file   Social Connections:     Frequency of Communication with Friends and Family: Not on file    Frequency of Social Gatherings with Friends and Family: Not on file    Attends [employer] Services: Not on file    Active Member of [address] [place] [address] [employer] or Organizations: Not on file    Attends Club or Organization Meetings: Not on file    Marital Status: Not on file   Intimate Partner Violence:     Fear of Current or Ex-Partner: Not on file    Emotionally Abused: Not on file    Physically Abused: Not on file    Sexually Abused: Not on file   Housing Stability:     Unable to Pay for Housing in the Last Year: Not on file    Number of [place] in the Last Year: Not on file    Unstable Housing in the Last Year: Not on file      No Known Allergies   Current Outpatient Medications   Medication Sig    meloxicam (MOBIC) 15 mg tablet Take 1 Tablet by mouth daily.  lidocaine 4 % patch 1 Patch by TransDERmal route every twelve (12) hours every twelve (12) hours.  ergocalciferol (DrisdoL) 1,250 mcg (50,000 unit) capsule Take 1 Capsule by mouth every seven (7) days. No current facility-administered medications for this visit. PHYSICAL EXAMINATION:  Visit Vitals  Pulse 61   Temp 97.4 °F (36.3 °C) (Temporal)   Ht 5' 0.5\" (1.537 m)   Wt 220 lb (99.8 kg)   SpO2 95%   BMI 42.26 kg/m²    Appearance: Alert, well appearing and pleasant patient who is in no distress, oriented to person, place/time, and who follows commands. HEENT: Milo Shock hears well, does not require hearing aids. Her sclera of the eyes are non-icteric. She is breathing normally and no respiratory accessory muscle use is noted. No JVD present and Neck ROM within normal limits. Psychiatric: Affect and mood are appropriate. Oriented x3  Cardiovascular/Peripheral Vascular: Normal pulses to each foot. Integumentary: No rashes. Warm and normal color. No drainage. Gait: antalgic  Sensory Exam: Intact/Normal Sensation    Lymphatic: No evidence of Lymphedema  Vascular:       Pulses: palpable  Varicosities none  Wounds/Abrasion: None Present  Neuro: Negative, no tremors  ORTHO EXAMINATION:  Examination Right knee Left knee   Skin Intact Intact   Range of motion 90-0 100-0   Effusion - -   Medial joint line tenderness + -   Lateral joint line tenderness - -   Popliteal tenderness + -   Osteophytes palpable + -   Chepes - -   Patella crepitus - -   Anterior drawer - -   Lateral laxity - -   Medial laxity - -   Varus deformity - -   Valgus deformity - -   Pretibial edema - -   Calf tenderness - -     RADIOGRAPHS:  XR RIGHT KNEE 3/26/19 OBICI  IMPRESSION   Chronic degenerative changes most pronounced in the medial compartment. IMPRESSION:      ICD-10-CM ICD-9-CM    1.  Primary osteoarthritis of right knee  M17.11 715.16 REFERRAL TO PHYSICAL THERAPY PROCEDURE AUTHORIZATION TO    2. Chronic pain of right knee  M25.561 719.46 REFERRAL TO PHYSICAL THERAPY    G89.29 338.29 PROCEDURE AUTHORIZATION TO    3. Bird cyst, right  M71.21 727.51      PLAN: Begin permanent partial knee restrictions. Consider visco supplementation if pain continues. She will start a brief course of outpatient physical therapy. We discussed possible need for a right knee arthroplasty at some time in the future if pain continues, pending weight loss. She will follow up as needed.       Scribed by Darren Cruz ([address]) as dictated by Sue Wray MD

## 2022-05-23 RX ORDER — LIDOCAINE 50 MG/G
PATCH TOPICAL
Qty: 30 EACH | Refills: 3 | Status: SHIPPED | OUTPATIENT
Start: 2022-05-23 | End: 2022-07-14 | Stop reason: SDUPTHER

## 2022-06-02 ENCOUNTER — TELEPHONE (OUTPATIENT)
Dept: FAMILY MEDICINE CLINIC | Age: 62
End: 2022-06-02

## 2022-06-02 DIAGNOSIS — M79.671 PAIN IN BOTH FEET: Primary | ICD-10-CM

## 2022-06-02 DIAGNOSIS — M79.672 PAIN IN BOTH FEET: Primary | ICD-10-CM

## 2022-06-02 NOTE — TELEPHONE ENCOUNTER
Patient called stating that her referral for Podiatry submitted on 02/07/22 to  [facility] was unable to be schedule due to the fact, that the office is not in network with medicaid. Please review and advise. Patient can be reached at [phone].

## 2022-06-06 ENCOUNTER — APPOINTMENT (OUTPATIENT)
Dept: PHYSICAL THERAPY | Age: 62
End: 2022-06-06

## 2022-06-08 NOTE — TELEPHONE ENCOUNTER
Note    [facility] [id number]  [place], [address]     Phone: [phone]  Fax: [phone]     Called pt to let her know about referral. 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

## 2022-06-20 ENCOUNTER — APPOINTMENT (OUTPATIENT)
Dept: PHYSICAL THERAPY | Age: 62
End: 2022-06-20

## 2022-07-11 ENCOUNTER — APPOINTMENT (OUTPATIENT)
Dept: PHYSICAL THERAPY | Age: 62
End: 2022-07-11

## 2022-07-14 ENCOUNTER — VIRTUAL VISIT (OUTPATIENT)
Dept: FAMILY MEDICINE CLINIC | Age: 62
End: 2022-07-14
Payer: MEDICAID

## 2022-07-14 DIAGNOSIS — M25.562 CHRONIC PAIN OF BOTH KNEES: ICD-10-CM

## 2022-07-14 DIAGNOSIS — R05.9 COUGH: Primary | ICD-10-CM

## 2022-07-14 DIAGNOSIS — G89.29 CHRONIC PAIN OF BOTH KNEES: ICD-10-CM

## 2022-07-14 DIAGNOSIS — M25.561 CHRONIC PAIN OF BOTH KNEES: ICD-10-CM

## 2022-07-14 PROCEDURE — 99442 PR PHYS/QHP TELEPHONE EVALUATION 11-20 MIN: CPT | Performed by: FAMILY MEDICINE

## 2022-07-14 RX ORDER — AZITHROMYCIN 250 MG/1
TABLET, FILM COATED ORAL
Qty: 6 TABLET | Refills: 0 | Status: SHIPPED | OUTPATIENT
Start: 2022-07-14 | End: 2022-07-19

## 2022-07-14 RX ORDER — LIDOCAINE 50 MG/G
PATCH TOPICAL
Qty: 30 EACH | Refills: 3 | Status: SHIPPED | OUTPATIENT
Start: 2022-07-14

## 2022-07-14 NOTE — PROGRESS NOTES
Agnes Gaines is a 57 y.o. female, evaluated via audio-only technology on 7/14/2022 for No chief complaint on file. .    Assessment & Plan:   Diagnoses and all orders for this visit:    1. Cough  -     azithromycin (ZITHROMAX) 250 mg tablet; Take 2 tablets today, then take 1 tablet daily    2. Chronic pain of both knees  -     lidocaine (LIDODERM) 5 %; Apply patch to the affected area for 12 hours a day and remove for 12 hours a day. 12  Subjective:       Prior to Admission medications    Medication Sig Start Date End Date Taking? Authorizing Provider   meloxicam (MOBIC) 15 mg tablet Take 1 Tablet by mouth daily. 5/9/22  Yes Brennan Galdamez MD   ergocalciferol (DrisdoL) 1,250 mcg (50,000 unit) capsule Take 1 Capsule by mouth every seven (7) days. 2/16/22  Yes Brennan Galdamez MD   lidocaine (LIDODERM) 5 % Apply patch to the affected area for 12 hours a day and remove for 12 hours a day. Patient not taking: Reported on 7/14/2022 5/23/22   Brennan Galdamez MD     There is no problem list on file for this patient. Past Medical History:   Diagnosis Date    Arthritis     Hypercholesteremia     SOB (shortness of breath) on exertion        Review of Systems   Constitutional: Positive for chills. Negative for fever, malaise/fatigue and weight loss. HENT: Positive for congestion. Eyes: Negative for blurred vision. Respiratory: Positive for cough. Negative for shortness of breath and wheezing. Cardiovascular: Negative for chest pain. Gastrointestinal: Negative for nausea and vomiting. Musculoskeletal: Negative for myalgias. Skin: Negative for rash. Neurological: Negative for weakness. No data recorded     Agnes Gaines was evaluated through a patient-initiated, synchronous (real-time) audio only encounter. She (or guardian if applicable) is aware that it is a billable service, which includes applicable co-pays, with coverage as determined by her insurance carrier.  This visit was conducted with the patient's (and/or Primus Lean guardian's) verbal consent. She has not had a related appointment within my department in the past 7 days or scheduled within the next 24 hours. The patient was located in a state where the provider was licensed to provide care. The patient was located at: Home: [address] [place] [phone]  The provider was located at:  Facility (Appt Department): [address] [place] [address]  [phone]    Total Time: minutes: 11-20 minutes    Zenon Gomez MD

## 2022-07-14 NOTE — LETTER
NOTIFICATION RETURN TO WORK     7/14/2022 2:32 PM    Ms. Zain Zamudio  [facility]  [address] [place], [place] [address] [address]      To Whom It May Concern:    Zain Zamudio is currently under the care of [address] [facility]. She was out of work on 7/12/22 and will return to work on 7/19/22. If there are questions or concerns please have the patient contact our office.         Sincerely,      Martina Choi MD

## 2022-07-19 ENCOUNTER — APPOINTMENT (OUTPATIENT)
Dept: PHYSICAL THERAPY | Age: 62
End: 2022-07-19

## 2022-08-02 ENCOUNTER — TELEPHONE (OUTPATIENT)
Dept: FAMILY MEDICINE CLINIC | Age: 62
End: 2022-08-02

## 2022-08-29 ENCOUNTER — HOSPITAL ENCOUNTER (OUTPATIENT)
Dept: PHYSICAL THERAPY | Age: 62
Discharge: HOME | End: 2022-08-29
Attending: SPECIALIST
Payer: MEDICAID

## 2022-08-29 PROCEDURE — 97161 PT EVAL LOW COMPLEX 20 MIN: CPT

## 2022-08-29 NOTE — PROGRESS NOTES
PT DAILY TREATMENT NOTE     Patient Name: Sue Mendes  Date:2022  : 1960  [x]  Patient  Verified  Payor: BLUE CROSS MEDICAID / Plan: [facility] [employer] / Product Type: Managed Care Medicaid /    In time: 3:55  Out time: 4:23  Total Treatment Time (min): 28  Visit #: 1 of 4    Medicare/BCBS Only   Total Timed Codes (min):  0 1:1 Treatment Time:  28       Treatment Area: Right knee pain [M25.561]    SUBJECTIVE  Pain Level (0-10 scale): 8/10  Any medication changes, allergies to medications, adverse drug reactions, diagnosis change, or new procedure performed?: [x] No    [] Yes (see summary sheet for update)  Subjective functional status/changes:   [] No changes reported  Pt states, \"The cane helped\"    OBJECTIVE    28 min [x]Eval                  []Re-Eval             With   [] TE   [] TA   [] neuro   [] other: Patient Education: [x] Review HEP    [] Progressed/Changed HEP based on:   [x] positioning   [x] body mechanics   [] transfers   [] heat/ice application    [] other:      Other Objective/Functional Measures: see eval     Pain Level (0-10 scale) post treatment: 8/10    ASSESSMENT/Changes in Function:     Patient will continue to benefit from skilled PT services to modify and progress therapeutic interventions, address functional mobility deficits, address ROM deficits, address strength deficits, analyze and address soft tissue restrictions, analyze and cue movement patterns, analyze and modify body mechanics/ergonomics, assess and modify postural abnormalities, address imbalance/dizziness, and instruct in home and community integration to attain remaining goals. []  See Plan of Care  []  See progress note/recertification  []  See Discharge Summary         Progress towards goals / Updated goals:  Short Term Goals: To be accomplished in 2 weeks:  1. Pt will report compliance with HEP in order to tolerate repetitive/prolonged ADL's   Eval: issued  2.  Pt will report an average pain level of 1/10 or less in order to tolerate prolonged walking    Eval: 8/10    Long Term Goals: To be accomplished in 4 weeks:  1. Pt will obtain 120 degrees of right knee flexion AROM in order to return to repetitive stair negotiation   Eval: 102 degrees  2. Pt will score 54 on FOTO in order to return to PLOF   Eval: 17   3. Pt will achieve 0 degrees of right knee extension AROM in order to normalize gait pattern   Eval: lacking 2 degrees  4.  Pt will score 5/5 on hip flexion MMT bilaterally in order to tolerate repetitive transfers   Eval: 3+/5     PLAN  [x]  Upgrade activities as tolerated     [x]  Continue plan of care  []  Update interventions per flow sheet       []  Discharge due to:_  []  Other:_      Sharla Evangelista PT 8/29/2022  3:01 PM    Future Appointments   Date Time Provider Lenny Gallegos   8/29/2022  3:45 PM [facility]

## 2022-08-29 NOTE — PROGRESS NOTES
[facility] [place] [address]  [address], [address] [phone] fax    Plan of Care/ Statement of Necessity for Physical Therapy Services           Patient name: Wero Riser Start of Care: 2022   Referral source: Adeel Jarvis : 1960    Medical Diagnosis: Right knee pain [M25.561]  Payor: BLUE CROSS MEDICAID / Plan: [address] [place] [address] / Product Type: Managed Care Medicaid /  Onset Date:chronic conditon with recent exacerbations 07/10/2022    Treatment Diagnosis: right knee pain    Prior Hospitalization: see medical history Provider#: [id number]   Medications: Verified on Patient summary List    Comorbidities: Arthritis    Prior Level of Function: Able to tolerate prolonged standing/walking/working duties as a     The [address] [place] [address] and following information is based on the information from the initial evaluation. Assessment/ key information:     Patient is a 57 y. o.female who presents to PT with Right knee pain [M25.561] with chronic knee pain, for > 3 years, with recent acute exacerbations on 2022 (approximately). Pt reports insidious onset with no traumatic mechanism of injury. Ligamentous testing was negative, but pain was noticed with complaints of significant swelling in th posterior aspect of right knee pain. Pt was recommended to use a SPC to offload right knee, and exhibited decrease of symptoms with in clinic cane use. Pt currently exhibits decreased strength, ROM, decreased transfer abilities, and balance deficits. The patient will benefit from skilled PT services to address those deficits and facilitate return to premorbid activity level and improved quality of life.       Evaluation Complexity History MEDIUM  Complexity : 1-2 comorbidities / personal factors will impact the outcome/ POC ; Examination LOW Complexity : 1-2 Standardized tests and measures addressing body structure, function, activity limitation and / or participation in recreation  ;Presentation MEDIUM Complexity : Evolving with changing characteristics  ; Clinical Decision Making HIGH Complexity : FOTO score of 1- 25   Overall Complexity Rating: LOW   Problem List: pain affecting function, decrease ROM, decrease strength, edema affecting function, impaired gait/ balance, decrease ADL/ functional abilitiies, decrease activity tolerance, decrease flexibility/ joint mobility, and decrease transfer abilities   Treatment Plan may include any combination of the following: Therapeutic exercise, Therapeutic activities, Neuromuscular re-education, Physical agent/modality, Gait/balance training, Manual therapy, Patient education, Self Care training, Functional mobility training, Home safety training, and Stair training  Patient / Family readiness to learn indicated by: asking questions, trying to perform skills, and interest  Persons(s) to be included in education: patient (P)  Barriers to Learning/Limitations: None  Patient Goal (s): no pain  Patient Self Reported Health Status: fair  Rehabilitation Potential: good    Short Term Goals: To be accomplished in 2 weeks:  1. Pt will report compliance with HEP in order to tolerate repetitive/prolonged ADL's   Eval: issued  2. Pt will report an average pain level of 1/10 or less in order to tolerate prolonged walking    Eval: 8/10    Long Term Goals: To be accomplished in 4 weeks:  1. Pt will obtain 120 degrees of right knee flexion AROM in order to return to repetitive stair negotiation   Eval: 102 degrees  2. Pt will score 54 on FOTO in order to return to PLOF   Eval: 17   3. Pt will achieve 0 degrees of right knee extension AROM in order to normalize gait pattern   Eval: lacking 2 degrees  4. Pt will score 5/5 on hip flexion MMT bilaterally in order to tolerate repetitive transfers   Eval: 3+/5     Frequency / Duration: Patient to be seen 1-2 times per week for 4 weeks.     Patient/ Caregiver education and instruction: Diagnosis, prognosis, self care, activity modification, and exercises   [x]  Plan of care has been reviewed with PTA Woodroe Leyden, PT 8/29/2022 3:01 PM    ________________________________________________________________________    I certify that the above Therapy Services are being furnished while the patient is under my care. I agree with the treatment plan and certify that this therapy is necessary.     Physician's Signature:____________Date:_________TIME:________     Omer Dasilva PA-C  ** Signature, Date and Time must be completed for valid certification **    Please sign and return to [facility] [place] [address]  [address] [place], [address] [phone] fax

## 2022-09-26 ENCOUNTER — TELEPHONE (OUTPATIENT)
Dept: PHYSICAL THERAPY | Age: 62
End: 2022-09-26

## 2022-10-03 ENCOUNTER — HOSPITAL ENCOUNTER (OUTPATIENT)
Dept: PHYSICAL THERAPY | Age: 62
Discharge: HOME | End: 2022-10-03
Attending: SPECIALIST
Payer: MEDICAID

## 2022-10-03 PROCEDURE — 97530 THERAPEUTIC ACTIVITIES: CPT

## 2022-10-03 PROCEDURE — 97112 NEUROMUSCULAR REEDUCATION: CPT

## 2022-10-03 PROCEDURE — 97110 THERAPEUTIC EXERCISES: CPT

## 2022-10-03 NOTE — PROGRESS NOTES
PT DAILY TREATMENT NOTE     Patient Name: Temo Glasgow  Date:10/3/2022  : 1960  [x]  Patient  Verified  Payor: BLUE CROSS MEDICAID / Plan: [facility] [employer] / Product Type: Managed Care Medicaid /    In time: 1:30   Out time: 2:10  Total Treatment Time (min): 40  Visit #: 1 of 8    Medicare/BCBS Only   Total Timed Codes (min): 40 1:1 Treatment Time:  40       Treatment Area: Right knee pain [M25.561]    SUBJECTIVE  Pain Level (0-10 scale): 8  Any medication changes, allergies to medications, adverse drug reactions, diagnosis change, or new procedure performed?: [x] No    [] Yes (see summary sheet for update)  Subjective functional status/changes:   [] No changes reported  Pt reports compliance with HEP. OBJECTIVE    8 min Therapeutic Exercise:  [x] See flow sheet :   Rationale: increase ROM and increase strength to improve the patients ability to tolerate daily tasks. 20 min Therapeutic Activity:  [x]  See flow sheet : goal assessment, FOTO   Rationale: increase ROM, increase strength, and increase proprioception  to improve the patients ability to tolerate daily activities    12 min Neuromuscular Re-education:  [x]  See flow sheet : hip/glute/quad re-education exercises. Rationale: increase strength, improve coordination, and increase proprioception  to improve the patients ability to tolerate functional activities. With   [x] TE   [x] TA   [x] neuro   [] other: Patient Education: [x] Review HEP    [] Progressed/Changed HEP based on:   [x] positioning   [x] body mechanics   [] transfers   [] heat/ice application    [] other:      Other Objective/Functional Measures: See goals below. Functional Gains: exercises help with the tingling in the LEs  Functional Deficits: standing/walking increases pain and numbness, trouble descending stairs (feels like the leg is dragging), swelling in back of the knee.    Pain        Best: 6/10     Worst: 10/10    Pain Level (0-10 scale) post treatment: 3    ASSESSMENT/Changes in Function: See PN. Reported improvement in pain post stationary bike today. Today is the pt's first follow up session since the evaluation on 8/29/2022. She continues to have pain with stairs, standing/walking and continues to have swelling in the back of the knee. She reports some of the exercises help with the tingling in the LEs. We will plan on continuing therapy to improve the pt's pain, ROM, and mobility. Patient will continue to benefit from skilled PT services to modify and progress therapeutic interventions, address functional mobility deficits, address ROM deficits, address strength deficits, analyze and address soft tissue restrictions, analyze and cue movement patterns, analyze and modify body mechanics/ergonomics, assess and modify postural abnormalities, address imbalance/dizziness, and instruct in home and community integration to attain remaining goals. []  See Plan of Care  []  See progress note/recertification  []  See Discharge Summary         Progress towards goals / Updated goals:  Short Term Goals: To be accomplished in 2 weeks:  1. Pt will report compliance with HEP in order to tolerate repetitive/prolonged ADL's   Eval: issued   Reports compliance  2. Pt will report an average pain level of 1/10 or less in order to tolerate prolonged walking    Eval: 8/10   Not met, 6-10/10 pain ranges  Long Term Goals: To be accomplished in 4 weeks:  1. Pt will obtain 120 degrees of right knee flexion AROM in order to return to repetitive stair negotiation   Eval: 102 degrees   Not met, 107 degs  2. Pt will score 54 on FOTO in order to return to PLOF   Eval: 17  Progressing, 25 points   3. Pt will achieve 0 degrees of right knee extension AROM in order to normalize gait pattern   Eval: lacking 2 degrees   Not met, lacking 6 degs  4.  Pt will score 5/5 on hip flexion MMT bilaterally in order to tolerate repetitive transfers   Eval: 3+/5 Progressing, right 4-/5 with pain, left 4/5    PLAN  [x]  Upgrade activities as tolerated     [x]  Continue plan of care  [x]  Update interventions per flow sheet       []  Discharge due to:_  []  Other:_      Teresita Maxwell, ONEYDA 10/3/2022  3:01 PM    Future Appointments   Date Time Provider Lenny Gallegos   10/10/2022  1:30 PM Sarah Goodrich, PT [facility]

## 2022-10-03 NOTE — PROGRESS NOTES
[facility] [place] [address]  [address], [address] [phone] fax    Progress Note  Patient name: Kirk Rivero Start of Care: 2022   Referral source: Ron Nelson : 1960                Medical Diagnosis: Right knee pain [M25.561]  Payor: BLUE CROSS MEDICAID / Plan: [facility] HEALTHKEEPERS PLUS / Product Type: Managed Care Medicaid /  Onset Date:chronic conditon with recent exacerbations 07/10/2022                Treatment Diagnosis: right knee pain    Prior Hospitalization: see medical history Provider#: [id number]   Medications: Verified on Patient summary List    Comorbidities: Arthritis    Prior Level of Function: Able to tolerate prolonged standing/walking/working duties as a   Visits from [employer] of Care: 2    Missed Visits: 0    Established Goals:        Excellent         Good         Limited            None  [x] Increased ROM   []  []  [x]  []  [x] Increased Strength  []  [x]  []  []  [x] Increased Mobility  []  []  [x]  []   [x] Decreased Pain   []  []  [x]  []  [x] Decreased Swelling  []  []  [x]  []    Key Functional Changes:   Functional Gains: exercises help with the tingling in the LEs  Functional Deficits: standing/walking increases pain and numbness, trouble descending stairs (feels like the leg is dragging), swelling in back of the knee. Pain         Best: 6/10                Worst: 10/10    Current goals:  Short Term Goals: To be accomplished in 2 weeks:  1. Pt will report compliance with HEP in order to tolerate repetitive/prolonged ADL's              Eval: issued              Reports compliance  2. Pt will report an average pain level of 1/10 or less in order to tolerate prolonged walking               Eval: 8/10              Not met, 6-10/10 pain ranges  Long Term Goals: To be accomplished in 4 weeks:  1.  Pt will obtain 120 degrees of right knee flexion AROM in order to return to repetitive stair negotiation              Eval: 102 degrees              Not met, 107 degs  2. Pt will score 54 on FOTO in order to return to PLOF              Eval: 17  Progressing, 25 points   3. Pt will achieve 0 degrees of right knee extension AROM in order to normalize gait pattern              Eval: lacking 2 degrees              Not met, lacking 6 degs  4. Pt will score 5/5 on hip flexion MMT bilaterally in order to tolerate repetitive transfers              Eval: 3+/5               Progressing, right 4-/5 with pain, left 4/5    Updated Goals: to be achieved in 4 weeks:  1. Pt will obtain 120 degrees of right knee flexion AROM in order to return to repetitive stair negotiation              PN: 107 degs  2. Pt will score 54 on FOTO in order to return to PLOF              PN: 25 points  3. Pt will achieve 0 degrees of right knee extension AROM in order to normalize gait pattern              PN: lacking 6 degs  4. Pt will score 5/5 on hip flexion MMT bilaterally in order to tolerate repetitive transfers              PN: right 4-/5 with pain, left 4/5  5. Pt will report being able to descend 4 six inch steps with no feelings of her right LE dragging to improve gait quality and safety,    PN: has trouble descending stairs (feels like the leg is dragging)    ASSESSMENT/RECOMMENDATIONS:  Today is the pt's first follow up session since the evaluation on 8/29/2022. She continues to have pain with stairs, standing/walking and continues to have swelling in the back of the knee. She reports some of the exercises help with the tingling in the LEs. We will plan on continuing therapy to improve the pt's pain, ROM, and mobility.    [x]Continue therapy per initial plan/protocol at a frequency of  1-2 x per week for 4 weeks  []Continue therapy with the following recommended changes:_____________________      _____________________________________________________________________  []Discontinue therapy progressing towards or have reached established goals  []Discontinue therapy due to lack of appreciable progress towards goals  []Discontinue therapy due to lack of attendance or compliance  []Await Physician's recommendations/decisions regarding therapy  []Other:________________________________________________________________    Thank you for this referral.    Stanley Gramajo, PT 10/3/2022 1:49 PM  NOTE TO PHYSICIAN:  [address] AND   FAX TO [facility]: ([phone]  If you are unable to process this request in 24 hours please contact our office: [phone]    []  I have read the above report and request that my patient continue as recommended. []  I have read the above report and request that my patient continue therapy with the following changes/special instructions:________________________________________  [] I have read the above report and request that my patient be discharged from therapy.     Physician's Signature:____________Date:_________TIME:________     Ferdinand Seymour PA-C  ** Signature, Date and Time must be completed for valid certification **

## 2022-10-04 ENCOUNTER — VIRTUAL VISIT (OUTPATIENT)
Dept: FAMILY MEDICINE CLINIC | Age: 62
End: 2022-10-04
Payer: MEDICAID

## 2022-10-04 DIAGNOSIS — G89.29 CHRONIC PAIN OF BOTH KNEES: Primary | ICD-10-CM

## 2022-10-04 DIAGNOSIS — M25.561 CHRONIC PAIN OF BOTH KNEES: Primary | ICD-10-CM

## 2022-10-04 DIAGNOSIS — M25.562 CHRONIC PAIN OF BOTH KNEES: Primary | ICD-10-CM

## 2022-10-04 PROCEDURE — 99442 PR PHYS/QHP TELEPHONE EVALUATION 11-20 MIN: CPT | Performed by: FAMILY MEDICINE

## 2022-10-04 RX ORDER — MELOXICAM 15 MG/1
15 TABLET ORAL DAILY
Qty: 30 TABLET | Refills: 5 | Status: SHIPPED | OUTPATIENT
Start: 2022-10-04

## 2022-10-04 NOTE — PROGRESS NOTES
Chief Complaint   Patient presents with    Knee Swelling     Pt VV for right knee swelling. Describes it as 'aching pain'.  Denies

## 2022-10-04 NOTE — LETTER
NOTIFICATION RETURN TO WORK     10/4/2022 2:17 PM    Ms. Taz Melgar  [facility]  [address] [place] [id number]      To Whom It May Concern:    Taz Melgar is currently under the care of [address] [facility]. She was out of work on 10/3 and 10/4/22. She will return on 10/5/22. If there are questions or concerns please have the patient contact our office.         Sincerely,      Irena Deleon MD

## 2022-10-10 ENCOUNTER — HOSPITAL ENCOUNTER (OUTPATIENT)
Dept: PHYSICAL THERAPY | Age: 62
Discharge: HOME | End: 2022-10-10
Attending: SPECIALIST
Payer: MEDICAID

## 2022-10-10 PROCEDURE — 97530 THERAPEUTIC ACTIVITIES: CPT

## 2022-10-10 PROCEDURE — 97110 THERAPEUTIC EXERCISES: CPT

## 2022-10-10 PROCEDURE — 97112 NEUROMUSCULAR REEDUCATION: CPT

## 2022-10-10 NOTE — PROGRESS NOTES
PT DAILY TREATMENT NOTE     Patient Name: Marcie Li  Date:10/10/2022  : 1960  [x]  Patient  Verified  Payor: BLUE CROSS MEDICAID / Plan: [facility] HEALTHKEEPERS PLUS / Product Type: Managed Care Medicaid /    In time:130  Out time:214  Total Treatment Time (min): 44  Visit #: 1 of 8    Medicare/BCBS Only   Total Timed Codes (min):  44 1:1 Treatment Time:  44       Treatment Area: Right knee pain [M25.561]    SUBJECTIVE  Pain Level (0-10 scale): 0  Any medication changes, allergies to medications, adverse drug reactions, diagnosis change, or new procedure performed?: [x] No    [] Yes (see summary sheet for update)  Subjective functional status/changes:   [] No changes reported  Patient reports her knee isn't hurting today, it's just stiff. OBJECTIVE    20 min Therapeutic Exercise:  [x] See flow sheet :   Rationale: increase ROM and increase strength to improve the patients ability to increase activity tolerance    14 min Therapeutic Activity:  [x]  See flow sheet : squatting mechanics, standing functional hip strength   Rationale: increase ROM, increase strength, and improve coordination  to improve the patients ability to improve standing and walking tolerance     10 min Neuromuscular Re-education:  [x]  See flow sheet : quad/glut re-ed   Rationale: increase strength, improve coordination, improve balance, and increase proprioception  to improve the patients ability to Increase stability in stance and with ambulation    With   [] TE   [] TA   [] neuro   [] other: Patient Education: [x] Review HEP    [] Progressed/Changed HEP based on:   [] positioning   [] body mechanics   [] transfers   [] heat/ice application    [] other:      Other Objective/Functional Measures: progressed per flow sheet     Pain Level (0-10 scale) post treatment: 0    ASSESSMENT/Changes in Function: Patient reports no pain, just stiffness in her knee today.  States she has to walk down the stairs sideways; advised this is likely due to quad weakness and limited knee flexion ROM. She did well with progression of exercises with no c/o pain throughout session. Needed cuing for upright posture with standing exercises and to reduce trunk flexion with squats. Patient will continue to benefit from skilled PT services to modify and progress therapeutic interventions, address functional mobility deficits, address ROM deficits, address strength deficits, analyze and address soft tissue restrictions, analyze and cue movement patterns, analyze and modify body mechanics/ergonomics, assess and modify postural abnormalities, address imbalance/dizziness, and instruct in home and community integration to attain remaining goals. []  See Plan of Care  []  See progress note/recertification  []  See Discharge Summary         Progress towards goals / Updated goals:  Updated Goals: to be achieved in 4 weeks:  1. Pt will obtain 120 degrees of right knee flexion AROM in order to return to repetitive stair negotiation              PN: 107 degs  2. Pt will score 54 on FOTO in order to return to PLOF              PN: 25 points  3. Pt will achieve 0 degrees of right knee extension AROM in order to normalize gait pattern              PN: lacking 6 degs  4. Pt will score 5/5 on hip flexion MMT bilaterally in order to tolerate repetitive transfers              PN: right 4-/5 with pain, left 4/5  5.  Pt will report being able to descend 4 six inch steps with no feelings of her right LE dragging to improve gait quality and safety,               PN: has trouble descending stairs (feels like the leg is dragging)    PLAN  [x]  Upgrade activities as tolerated     [x]  Continue plan of care  []  Update interventions per flow sheet       []  Discharge due to:_  []  Other:_      Mabel Cotton, PTA 10/10/2022  1:34 PM    Future Appointments   Date Time Provider Lenny Gallegos   10/24/2022  1:30 PM Fouzia Pozo [facility]   10/31/2022  1:30 PM Ludivina Alarcon, PT [facility]

## 2022-10-17 ENCOUNTER — APPOINTMENT (OUTPATIENT)
Dept: PHYSICAL THERAPY | Age: 62
End: 2022-10-17
Attending: SPECIALIST
Payer: MEDICAID

## 2022-10-19 ENCOUNTER — VIRTUAL VISIT (OUTPATIENT)
Dept: FAMILY MEDICINE CLINIC | Age: 62
End: 2022-10-19
Payer: MEDICAID

## 2022-10-19 DIAGNOSIS — M25.562 CHRONIC PAIN OF BOTH KNEES: Primary | ICD-10-CM

## 2022-10-19 DIAGNOSIS — G89.29 CHRONIC PAIN OF BOTH KNEES: Primary | ICD-10-CM

## 2022-10-19 DIAGNOSIS — M25.561 CHRONIC PAIN OF BOTH KNEES: Primary | ICD-10-CM

## 2022-10-19 PROCEDURE — 99442 PR PHYS/QHP TELEPHONE EVALUATION 11-20 MIN: CPT | Performed by: FAMILY MEDICINE

## 2022-10-19 RX ORDER — DICLOFENAC SODIUM 10 MG/G
GEL TOPICAL 4 TIMES DAILY
Qty: 100 G | Refills: 3 | Status: SHIPPED | OUTPATIENT
Start: 2022-10-19 | End: 2022-11-03 | Stop reason: SDUPTHER

## 2022-10-19 NOTE — LETTER
NOTIFICATION RETURN TO WORK / SCHOOL    10/19/2022 10:23 AM    Ms. Cody Gandhi  [address] [facility] [address]  [address] [place] [id number]      To Whom It May Concern:    Cody Gandhi is currently under the care of [address]. She was off work 10/17/22 - 10/19/22. She will return to work on 10/20/22. If there are questions or concerns please have the patient contact our office.         Sincerely,      Matt Funez MD

## 2022-10-19 NOTE — PROGRESS NOTES
Skip Rosas is a 57 y.o. female, evaluated via audio-only technology on 10/19/2022 for Knee Pain (Right)  . Assessment & Plan:   Diagnoses and all orders for this visit:    1. Chronic pain of both knees  -     diclofenac (VOLTAREN) 1 % gel; Apply  to affected area four (4) times daily. 12  Subjective:       Prior to Admission medications    Medication Sig Start Date End Date Taking? Authorizing Provider   meloxicam (MOBIC) 15 mg tablet Take 1 Tablet by mouth daily. 10/4/22  Yes Allison Anderson MD   lidocaine (LIDODERM) 5 % Apply patch to the affected area for 12 hours a day and remove for 12 hours a day. 7/14/22  Yes Allison Anderson MD   ergocalciferol (DrisdoL) 1,250 mcg (50,000 unit) capsule Take 1 Capsule by mouth every seven (7) days. 2/16/22  Yes Allison Anderson MD     There is no problem list on file for this patient. Past Surgical History:   Procedure Laterality Date    [place] EXPLORATORY OF ABDOMEN      gallstone removal       Review of Systems   Constitutional:  Negative for chills, fever, malaise/fatigue and weight loss. Eyes:  Negative for blurred vision. Respiratory:  Negative for cough, shortness of breath and wheezing. Cardiovascular:  Negative for chest pain. Gastrointestinal:  Negative for nausea and vomiting. Musculoskeletal:  Positive for joint pain. Negative for myalgias. Skin:  Negative for rash. Neurological:  Negative for weakness. Psychiatric/Behavioral: Negative. No data recorded     Skip Rosas was evaluated through a patient-initiated, synchronous (real-time) audio only encounter. She (or guardian if applicable) is aware that it is a billable service, which includes applicable co-pays, with coverage as determined by her insurance carrier. This visit was conducted with the patient's (and/or Lorene Alberts guardian's) verbal consent. She has not had a related appointment within my department in the past 7 days or scheduled within the next 24 hours.  The patient was located in a state where the provider was licensed to provide care. The patient was located at: Home: [address] [phone]  The provider was located at:  Facility (Appt Department): [address] [place] [address]  [phone]    Total Time: minutes: 11-20 minutes    Jose Alfredo Morrison MD

## 2022-10-19 NOTE — PROGRESS NOTES
Chief Complaint   Patient presents with    Knee Pain     Right       Patient being seen today via [facility] for right knee pain x 2 months. She denies any injury on trauma to right knee. She says pain is constant and walking make pain worse. Pain score 10.     1. \"Have you been to the ER, urgent care clinic since your last visit? Hospitalized since your last visit? \" No    2. \"Have you seen or consulted any other health care providers outside of the [address] [place] [address] since your last visit? \" No     3. For patients aged 39-70: Has the patient had a colonoscopy / FIT/ Cologuard? Yes - no Care Gap present      If the patient is female:    4. For patients aged 41-77: Has the patient had a mammogram within the past 2 years? Yes - no Care Gap present      5. For patients aged 21-65: Has the patient had a pap smear?  No

## 2022-10-20 ENCOUNTER — TELEPHONE (OUTPATIENT)
Dept: FAMILY MEDICINE CLINIC | Age: 62
End: 2022-10-20

## 2022-10-20 NOTE — TELEPHONE ENCOUNTER
Patient called and states the pharmacy needs more information on the Diclofenac Sodium before they will fill. Not sure if it is a prior auth or they need instructions.

## 2022-10-24 ENCOUNTER — APPOINTMENT (OUTPATIENT)
Dept: PHYSICAL THERAPY | Age: 62
End: 2022-10-24
Attending: SPECIALIST
Payer: MEDICAID

## 2022-10-27 ENCOUNTER — TELEPHONE (OUTPATIENT)
Dept: PHYSICAL THERAPY | Age: 62
End: 2022-10-27

## 2022-10-31 ENCOUNTER — HOSPITAL ENCOUNTER (OUTPATIENT)
Dept: PHYSICAL THERAPY | Age: 62
Discharge: HOME | End: 2022-10-31
Attending: SPECIALIST
Payer: MEDICAID

## 2022-10-31 PROCEDURE — 97530 THERAPEUTIC ACTIVITIES: CPT

## 2022-10-31 PROCEDURE — 97112 NEUROMUSCULAR REEDUCATION: CPT

## 2022-10-31 NOTE — PROGRESS NOTES
[facility] - [place] [address]  [address], [address] [phone] fax    Progress Note  Patient name: Cody Gandhi Start of Care: 2022   Referral source: Kathleen Winkler : 1960                Medical Diagnosis: Right knee pain [M25.561]  Payor: BLUE CROSS MEDICAID / Plan: [facility] HEALTHKEEPERS PLUS / Product Type: Managed Care Medicaid /  Onset Date:chronic conditon with recent exacerbations 07/10/2022                Treatment Diagnosis: right knee pain    Prior Hospitalization: see medical history Provider#: [id number]   Medications: Verified on Patient summary List    Comorbidities: Arthritis    Prior Level of Function: Able to tolerate prolonged standing/walking/working duties as a   Visits from [place] of Care: 4    Missed Visits: 1    Established Goals:        Excellent         Good         Limited            None  [] Increased ROM   []  []  [x]  []  [] Increased Strength  []  []  [x]  []  [] Increased Mobility  []  []  [x]  []   [] Decreased Pain   []  []  [x]  []  [] Decreased Swelling  []  []  []  []    Key Functional Changes:     Functional Gains: improved ambulation overall  Functional Deficits: decreased stair negotiation, increased stiffness, increased pain   % improvement: 20%  Pain   Average: 1010                  Best: 10                Worst: 10/10  Patient Goal: \"Climbing steps is terrible\"    1. Pt will obtain 120 degrees of right knee flexion AROM in order to return to repetitive stair negotiation              PN: 107 degs   PN: PROGRESSING, 114 degrees  2. Pt will score 54 on FOTO in order to return to PLOF              PN: 25 points  PN: PROGRESSING, 30  3. Pt will achieve 0 degrees of right knee extension AROM in order to normalize gait pattern              PN: lacking 6 degs   PN: PROGRESSING, lacking 3 degrees   4.  Pt will score 5/5 on hip flexion MMT bilaterally in order to tolerate repetitive transfers PN: right 4-/5 with pain, left 4/5   PN: NOT MET, 4-/5 with pain, left 4/5  5. Pt will report being able to descend 4 six inch steps with no feelings of her right LE dragging to improve gait quality and safety,               PN: has trouble descending stairs (feels like the leg is dragging)   PN: NOT MET, Pt reports severe difficulty and has to negotiate sideways     Updated Goals: to be achieved in 4 weeks:     1. Pt will obtain 120 degrees of right knee flexion AROM in order to return to repetitive stair negotiation              PN: 107 degs   PN: PROGRESSING, 114 degrees  2. Pt will score 54 on FOTO in order to return to PLOF              PN: 25 points  PN: PROGRESSING, 30  3. Pt will achieve 0 degrees of right knee extension AROM in order to normalize gait pattern              PN: lacking 6 degs   PN: PROGRESSING, lacking 3 degrees   4. Pt will score 5/5 on hip flexion MMT bilaterally in order to tolerate repetitive transfers              PN: right 4-/5 with pain, left 4/5   PN: NOT MET, 4-/5 with pain, left 4/5  5. Pt will report being able to descend 4 six inch steps with no feelings of her right LE dragging to improve gait quality and safety,               PN: has trouble descending stairs (feels like the leg is dragging)   PN: NOT MET, Pt reports severe difficulty and has to negotiate sideways     ASSESSMENT/RECOMMENDATIONS:    Pt reports 20% overall improvement since starting skilled PT. Pt reports increased ease with ambulation. However, pt is still severely limited with stairs, transfers for repetitive periods of times, and decreased tolerance to ADL's. Due to pt not being at her reported PLOF, pt would continue to benefit from ongoing skilled care to address remaining impairments listed above.      [x]Continue therapy per initial plan/protocol at a frequency of  1 x per week for 4 weeks  []Continue therapy with the following recommended changes:_____________________ _____________________________________________________________________  []Discontinue therapy progressing towards or have reached established goals  []Discontinue therapy due to lack of appreciable progress towards goals  []Discontinue therapy due to lack of attendance or compliance  []Await Physician's recommendations/decisions regarding therapy  []Other:________________________________________________________________    Thank you for this referral.    Jasmin Spears, PT 10/31/2022 1:43 PM  NOTE TO PHYSICIAN:  [address] TO [facility]: ([phone]  If you are unable to process this request in 24 hours please contact our office: [phone]    []  I have read the above report and request that my patient continue as recommended. []  I have read the above report and request that my patient continue therapy with the following changes/special instructions:________________________________________  [] I have read the above report and request that my patient be discharged from therapy.     Physician's Signature:____________Date:_________TIME:________     Rosemary Hawkins PA-C  ** Signature, Date and Time must be completed for valid certification **

## 2022-10-31 NOTE — PROGRESS NOTES
PT DAILY TREATMENT NOTE     Patient Name: Cody Gandhi  Date:10/31/2022  : 1960  [x]  Patient  Verified  Payor: BLUE CROSS MEDICAID / Plan: [facility] HEALTHKEEPERS PLUS / Product Type: Managed Care Medicaid /    In time: 1:39  Out time: 2:14  Total Treatment Time (min): 35  Visit #: 3 of 8    Medicare/BCBS Only   Total Timed Codes (min):  35 1:1 Treatment Time:  35       Treatment Area: Right knee pain [M25.561]    SUBJECTIVE  Pain Level (0-10 scale): 10/10  Any medication changes, allergies to medications, adverse drug reactions, diagnosis change, or new procedure performed?: [x] No    [] Yes (see summary sheet for update)  Subjective functional status/changes:   [] No changes reported  Pt states,     OBJECTIVE    8 min Therapeutic Exercise:  [x] See flow sheet :   Rationale: increase ROM, increase strength, improve coordination, and increase proprioception to improve the patients ability to return to PLOF    11 min Therapeutic Activity:  [x]  See flow sheet : FOTO and reassessmnet,    Rationale: increase ROM, increase strength, improve coordination, and increase proprioception  to improve the patients ability to tolerate repetitive transfers     8 min Neuromuscular Re-education:  [x]  See flow sheet :  band/ball, TB S/L clamshells in order to increase motor recruitment/control    Rationale: increase strength, improve coordination, improve balance, and increase proprioception  to improve the patients ability to Increase stability in stance and with ambulation     8 min Self Care/Home Management: Pt educated on overall CLOF, importance of HEP compliance, and discussed POC    Rationale: increase ROM, increase strength, improve coordination, improve balance, and increase proprioception  to improve the patients ability to tolerate ADL's           With   [x] TE   [x] TA   [] neuro   [] other: Patient Education: [x] Review HEP    [] Progressed/Changed HEP based on:   [x] positioning   [x] body mechanics   [x] transfers   [] heat/ice application    [] other:      Other Objective/Functional Measures: see progress note     Pain Level (0-10 scale) post treatment: 9/10    ASSESSMENT/Changes in Function: Pt arrived late to session. Patient will continue to benefit from skilled PT services to modify and progress therapeutic interventions, address functional mobility deficits, address ROM deficits, address strength deficits, analyze and address soft tissue restrictions, analyze and cue movement patterns, analyze and modify body mechanics/ergonomics, assess and modify postural abnormalities, address imbalance/dizziness, and instruct in home and community integration to attain remaining goals. []  See Plan of Care  [x]  See progress note/recertification  []  See Discharge Summary         Progress towards goals / Updated goals:  1. Pt will obtain 120 degrees of right knee flexion AROM in order to return to repetitive stair negotiation              PN: 107 degs   PN: PROGRESSING, 114 degrees  2. Pt will score 54 on FOTO in order to return to PLOF              PN: 25 points  PN: PROGRESSING, 30  3. Pt will achieve 0 degrees of right knee extension AROM in order to normalize gait pattern              PN: lacking 6 degs   PN: PROGRESSING, lacking 3 degrees   4. Pt will score 5/5 on hip flexion MMT bilaterally in order to tolerate repetitive transfers              PN: right 4-/5 with pain, left 4/5   PN: NOT MET, 4-/5 with pain, left 4/5  5.  Pt will report being able to descend 4 six inch steps with no feelings of her right LE dragging to improve gait quality and safety,               PN: has trouble descending stairs (feels like the leg is dragging)   PN: NOT MET, Pt reports severe difficulty and has to negotiate sideways     PLAN  [x]  Upgrade activities as tolerated     [x]  Continue plan of care  []  Update interventions per flow sheet       []  Discharge due to:_  []  Other:_      Veronica Robbins PT 10/31/2022  1:47 PM    Future Appointments   Date Time Provider Lenny Rosy   11/7/2022 10:30 AM [facility]   11/7/2022  2:15 PM Ted Priest MD [facility] BS AM[facility]   11/14/2022 10:30 AM [facility] Dam[facility]   11/21/2022 12:00 PM [facility]ay [facility]

## 2022-11-03 DIAGNOSIS — G89.29 CHRONIC PAIN OF BOTH KNEES: ICD-10-CM

## 2022-11-03 DIAGNOSIS — M25.562 CHRONIC PAIN OF BOTH KNEES: ICD-10-CM

## 2022-11-03 DIAGNOSIS — M25.561 CHRONIC PAIN OF BOTH KNEES: ICD-10-CM

## 2022-11-03 RX ORDER — DICLOFENAC SODIUM 10 MG/G
GEL TOPICAL 4 TIMES DAILY
Qty: 100 G | Refills: 3 | Status: SHIPPED | OUTPATIENT
Start: 2022-11-03 | End: 2022-11-07 | Stop reason: SDUPTHER

## 2022-11-07 ENCOUNTER — OFFICE VISIT (OUTPATIENT)
Dept: FAMILY MEDICINE CLINIC | Age: 62
End: 2022-11-07
Payer: MEDICAID

## 2022-11-07 ENCOUNTER — APPOINTMENT (OUTPATIENT)
Dept: PHYSICAL THERAPY | Age: 62
End: 2022-11-07
Attending: SPECIALIST
Payer: MEDICAID

## 2022-11-07 VITALS
OXYGEN SATURATION: 98 % | SYSTOLIC BLOOD PRESSURE: 126 MMHG | RESPIRATION RATE: 14 BRPM | BODY MASS INDEX: 41.87 KG/M2 | WEIGHT: 218 LBS | DIASTOLIC BLOOD PRESSURE: 82 MMHG | TEMPERATURE: 97.6 F | HEART RATE: 72 BPM

## 2022-11-07 DIAGNOSIS — M25.561 CHRONIC PAIN OF BOTH KNEES: Primary | ICD-10-CM

## 2022-11-07 DIAGNOSIS — G89.29 CHRONIC PAIN OF BOTH KNEES: Primary | ICD-10-CM

## 2022-11-07 DIAGNOSIS — M25.562 CHRONIC PAIN OF BOTH KNEES: Primary | ICD-10-CM

## 2022-11-07 PROCEDURE — 99213 OFFICE O/P EST LOW 20 MIN: CPT | Performed by: FAMILY MEDICINE

## 2022-11-07 RX ORDER — DICLOFENAC SODIUM 10 MG/G
GEL TOPICAL 4 TIMES DAILY
Qty: 100 G | Refills: 3 | Status: SHIPPED | OUTPATIENT
Start: 2022-11-07

## 2022-11-07 RX ORDER — CELECOXIB 200 MG/1
200 CAPSULE ORAL 2 TIMES DAILY
Qty: 60 CAPSULE | Refills: 2 | Status: SHIPPED | OUTPATIENT
Start: 2022-11-07 | End: 2023-02-05

## 2022-11-07 NOTE — PROGRESS NOTES
Chief Complaint   Patient presents with    Knee Pain     Patient here today to be seen for bilateral knee pain right knee is worse than left knee, x 2-3 years. 1. \"Have you been to the ER, urgent care clinic since your last visit? Hospitalized since your last visit? \" No    2. \"Have you seen or consulted any other health care providers outside of the [address] [place] [address] since your last visit? \" No     3. For patients aged 39-70: Has the patient had a colonoscopy / FIT/ Cologuard? Yes - no Care Gap present      If the patient is female:    4. For patients aged 41-77: Has the patient had a mammogram within the past 2 years? Yes - no Care Gap present      5. For patients aged 21-65: Has the patient had a pap smear?  No

## 2022-11-07 NOTE — PROGRESS NOTES
Cody Gandhi is a 57 y.o. female  presents for chronic knee pain. Right is worse than left. No new injury or trauma. No Known Allergies  Outpatient Medications Marked as Taking for the 11/7/22 encounter (Office Visit) with Kaylene Reece MD   Medication Sig Dispense Refill    diclofenac (VOLTAREN) 1 % gel Apply  to affected area four (4) times daily. 100 g 3    meloxicam (MOBIC) 15 mg tablet Take 1 Tablet by mouth daily. 30 Tablet 5    lidocaine (LIDODERM) 5 % Apply patch to the affected area for 12 hours a day and remove for 12 hours a day. 30 Each 3    ergocalciferol (DrisdoL) 1,250 mcg (50,000 unit) capsule Take 1 Capsule by mouth every seven (7) days. 12 Capsule 1     There is no problem list on file for this patient. Past Medical History:   Diagnosis Date    Arthritis     Hypercholesteremia     SOB (shortness of breath) on exertion      Social History     Socioeconomic History    Marital status: SINGLE   Tobacco Use    Smoking status: Never    Smokeless tobacco: Never   Substance and Sexual Activity    Alcohol use: Never    Drug use: Never     No family history on file. Review of Systems   Constitutional:  Negative for chills, fever, malaise/fatigue and weight loss. Eyes:  Negative for blurred vision. Respiratory:  Negative for cough, shortness of breath and wheezing. Cardiovascular:  Negative for chest pain. Gastrointestinal:  Negative for nausea and vomiting. Musculoskeletal:  Positive for joint pain. Negative for myalgias. Skin:  Negative for rash. Neurological:  Negative for weakness. Psychiatric/Behavioral: Negative. Vitals:    11/07/22 1449   BP: 126/82   Pulse: 72   Resp: 14   Temp: 97.6 °F (36.4 °C)   TempSrc: Tympanic   SpO2: 98%   Weight: 218 lb (98.9 kg)   PainSc:  10 - Worst pain ever   PainLoc: Knee       Physical Exam  Vitals and nursing note reviewed. Constitutional:       Appearance: Normal appearance. She is obese.    Cardiovascular:      Rate and Rhythm: Normal rate and regular rhythm. Heart sounds: Normal heart sounds. Pulmonary:      Effort: Pulmonary effort is normal.      Breath sounds: Normal breath sounds. Musculoskeletal:         General: Tenderness present. Normal range of motion. Cervical back: Normal range of motion and neck supple. Right lower leg: No edema. Left lower leg: No edema. Skin:     General: Skin is warm and dry. Neurological:      Mental Status: She is alert. Psychiatric:         Mood and Affect: Mood normal.         Behavior: Behavior normal.         Thought Content: Thought content normal.         Judgment: Judgment normal.       Assessment/Plan      ICD-10-CM ICD-9-CM    1. Chronic pain of both knees  M25.561 719.46 diclofenac (VOLTAREN) 1 % gel    M25.562 338.29 celecoxib (CELEBREX) 200 mg capsule    G89.29          I have discussed the diagnosis with the patient and the intended plan of care as seen in the above orders. The patient has received an after-visit summary and questions were answered concerning future plans. I have discussed medication, side effects, and warnings with the patient in detail. The patient verbalized understanding and is in agreement with the plan of care. The patient will contact the office with any additional concerns.     lab results and schedule of future lab studies reviewed with patient    Hilda Rivera MD

## 2022-11-08 ENCOUNTER — APPOINTMENT (OUTPATIENT)
Dept: PHYSICAL THERAPY | Age: 62
End: 2022-11-08
Attending: SPECIALIST
Payer: MEDICAID

## 2022-11-11 ENCOUNTER — TELEPHONE (OUTPATIENT)
Dept: FAMILY MEDICINE CLINIC | Age: 62
End: 2022-11-11

## 2022-11-11 NOTE — TELEPHONE ENCOUNTER
Fax received from [address] [place] [address] meds stating prior auth is required for the Celecoxib 200 MG Capsule. Submit a PA request  1. Go to key[employer] and click \"Enter a Key\"  2. Patient last name: Jens Che      : 1960      Key: [id number]  3.  Click \"start a PA\", complete the form, and \"send to plan\"

## 2022-11-14 ENCOUNTER — APPOINTMENT (OUTPATIENT)
Dept: PHYSICAL THERAPY | Age: 62
End: 2022-11-14
Attending: SPECIALIST
Payer: MEDICAID

## 2022-11-21 ENCOUNTER — HOSPITAL ENCOUNTER (OUTPATIENT)
Dept: PHYSICAL THERAPY | Age: 62
Discharge: HOME | End: 2022-11-21
Attending: SPECIALIST
Payer: MEDICAID

## 2022-11-21 PROCEDURE — 97112 NEUROMUSCULAR REEDUCATION: CPT

## 2022-11-21 PROCEDURE — 97110 THERAPEUTIC EXERCISES: CPT

## 2022-11-21 PROCEDURE — 97530 THERAPEUTIC ACTIVITIES: CPT

## 2022-11-21 NOTE — PROGRESS NOTES
PT DAILY TREATMENT NOTE     Patient Name: Aashish Daily  Date:2022  : 1960  [x]  Patient  Verified  Payor: BLUE CROSS MEDICAID / Plan: [facility] [employer] / Product Type: Managed Care Medicaid /    In time:1200  Out time:1239  Total Treatment Time (min): 39  Visit #: 1 of 4    Medicare/BCBS Only   Total Timed Codes (min):  39 1:1 Treatment Time:  39       Treatment Area: Right knee pain [M25.561]    SUBJECTIVE  Pain Level (0-10 scale): 8  Any medication changes, allergies to medications, adverse drug reactions, diagnosis change, or new procedure performed?: [x] No    [] Yes (see summary sheet for update)  Subjective functional status/changes:   [] No changes reported  \"I'm having a little bit of pain today. \"    OBJECTIVE    15 min Therapeutic Exercise:  [x] See flow sheet :   Rationale: increase ROM and increase strength to improve the patients ability to perform ADLs    15 min Therapeutic Activity:  [x]  See flow sheet : standing functional LE strength   Rationale: increase strength and improve coordination  to improve the patients ability to negotiate home and community     9 min Neuromuscular Re-education:  [x]  See flow sheet : quad kerline   Rationale: increase strength, improve coordination, and increase proprioception  to improve the patients ability to recover LOB    With   [] TE   [] TA   [] neuro   [] other: Patient Education: [x] Review HEP    [] Progressed/Changed HEP based on:   [] positioning   [] body mechanics   [] transfers   [] heat/ice application    [] other:      Other Objective/Functional Measures: see flow sheet     Pain Level (0-10 scale) post treatment: 6    ASSESSMENT/Changes in Function: Pt requiring cuing for SLR due to right quad lag; able to improve after instruction. Overall, tolerating exercises well. Pt is due for reassessment next visit.      Patient will continue to benefit from skilled PT services to modify and progress therapeutic interventions, address functional mobility deficits, address ROM deficits, address strength deficits, analyze and address soft tissue restrictions, analyze and cue movement patterns, analyze and modify body mechanics/ergonomics, assess and modify postural abnormalities, address imbalance/dizziness, and instruct in home and community integration to attain remaining goals. []  See Plan of Care  []  See progress note/recertification  []  See Discharge Summary         Progress towards goals / Updated goals:  1. Pt will obtain 120 degrees of right knee flexion AROM in order to return to repetitive stair negotiation              PN: PROGRESSING, 114 degrees   Reassess NV  2. Pt will score 54 on FOTO in order to return to PLOF              PN: PROGRESSING, 30   Reassess NV  3. Pt will achieve 0 degrees of right knee extension AROM in order to normalize gait pattern              PN: PROGRESSING, lacking 3 degrees    Reassess NV  4. Pt will score 5/5 on hip flexion MMT bilaterally in order to tolerate repetitive transfers              PN: NOT MET, 4-/5 with pain, left 4/5   Reassess NV  5.  Pt will report being able to descend 4 six inch steps with no feelings of her right LE dragging to improve gait quality and safety,               PN: NOT MET, Pt reports severe difficulty and has to negotiate sideways    Reassess NV    PLAN  [x]  Upgrade activities as tolerated     [x]  Continue plan of care  []  Update interventions per flow sheet       []  Discharge due to:_  []  Other:_      Katy Boggs, PT 11/21/2022  12:21 PM    Future Appointments   Date Time Provider Lenny Gallegos   11/28/2022 12:00 PM [facility] [address] [facility]

## 2022-11-28 ENCOUNTER — HOSPITAL ENCOUNTER (OUTPATIENT)
Dept: PHYSICAL THERAPY | Age: 62
Discharge: HOME | End: 2022-11-28
Attending: SPECIALIST
Payer: MEDICAID

## 2022-11-28 PROCEDURE — 97530 THERAPEUTIC ACTIVITIES: CPT

## 2022-11-28 PROCEDURE — 97110 THERAPEUTIC EXERCISES: CPT

## 2022-11-28 PROCEDURE — 97112 NEUROMUSCULAR REEDUCATION: CPT

## 2022-11-28 NOTE — PROGRESS NOTES
PT DAILY TREATMENT NOTE     Patient Name: Aashish Daily  Date:2022  : 1960  [x]  Patient  Verified  Payor: Matthias Rosas / Plan: [address] [place] [address] / Product Type: Managed Care Medicaid /    In time:11:30 AM  Out time:12:25 PM  Total Treatment Time (min): 55 min. Visit #: 2 of 4    Medicare/BCBS Only   Total Timed Codes (min):  55 1:1 Treatment Time:  50 min. Treatment Area: Right knee pain [M25.561]    SUBJECTIVE  Pain Level (0-10 scale): 0/10  Any medication changes, allergies to medications, adverse drug reactions, diagnosis change, or new procedure performed?: [x] No    [] Yes (see summary sheet for update)  Subjective functional status/changes:   [] No changes reported  No current c/o R knee pain, just \"stiff\". Left knee is doing well. OK after last PT session. OBJECTIVE    Modality rationale: decrease edema, decrease inflammation, decrease pain, and increase tissue extensibility to improve the patients ability to transfer, walk/community mobility, stand, work, and ADLs with less pain.    Min Type Additional Details    [] Estim:  []Unatt       []IFC  []Premod                        []Other:  []w/ice   []w/heat  Position:  Location:    [] Estim: []Att    []TENS instruct  []NMES                    []Other:  []w/US   []w/ice   []w/heat  Position:  Location:    []  Traction: [] Cervical       []Lumbar                       [] Prone          []Supine                       []Intermittent   []Continuous Lbs:  [] before manual  [] after manual    []  Ultrasound: []Continuous   [] Pulsed                           []1MHz   []3MHz W/cm2:  Location:    []  Iontophoresis with dexamethasone         Location: [] Take home patch   [] In clinic    []  Ice     []  heat  []  Ice massage  []  Laser   []  Anodyne Position:  Location:    []  Laser with stim  []R  Other:  Position:  Location:    []  Vasopneumatic Device    []  Right     []  Left  Pre-treatment girth:  Post-treatment girth:  Measured at (location):  Pressure:       [] lo [] med [] hi   Temperature: [] lo [] med [] hi   [] Skin assessment post-treatment:  []intact []redness- no adverse reaction    []redness - adverse reaction:     0 min []Eval                  []Re-Eval       20 min Therapeutic Exercise:  [] See flow sheet :   Rationale: increase ROM, increase strength, improve coordination, and increase proprioception to improve the patients ability to transfer, walk/community mobility, stand, work, and ADLs with less pain. 15 min Therapeutic Activity:  []  See flow sheet :   Rationale: increase ROM, increase strength, improve coordination, improve balance, and increase proprioception  to improve the patients ability to transfer, walk/community mobility, stand, work, and ADLs with less pain. 15 min Neuromuscular Re-education:  []  See flow sheet :   Rationale: increase strength, improve coordination, improve balance, and increase proprioception  to improve the patients ability to transfer, walk/community mobility, stand, work, and ADLs with less pain. 0 min Manual Therapy:  NA   The manual therapy interventions were performed at a separate and distinct time from the therapeutic activities interventions. Rationale: decrease pain, increase ROM, increase tissue extensibility, decrease edema , decrease trigger points, and increase postural awareness to transfer, walk/community mobility, stand, work, and ADLs with less pain. 0 min Gait Training:  ___ feet with ___ device on level surfaces with ___ level of assist   Rationale:    0 min Self Care/Home Management: NA   Rationale: increase ROM, increase strength, improve coordination, improve balance, and increase proprioception  to improve the patients ability to transfer, walk/community mobility, stand, work, and ADLs with less pain.           With   [] TE   [] TA   [] neuro   [] other: Patient Education: [x] Review HEP    [] Progressed/Changed HEP based on:   [] positioning   [] body mechanics   [] transfers   [] heat/ice application    [] other:      Other Objective/Functional Measures: Pt tighter L gastroc and/or lacks full knee ext R with incline gastroc stretches--try doing one at a time. Pain Level (0-10 scale) post treatment: 0/10    ASSESSMENT/Changes in Function: Unsteady balance on R LE more so than R in SLS for hip functionals with no/minimal UE use. Patient will continue to benefit from skilled PT services to modify and progress therapeutic interventions, address functional mobility deficits, address ROM deficits, address strength deficits, analyze and address soft tissue restrictions, analyze and cue movement patterns, analyze and modify body mechanics/ergonomics, assess and modify postural abnormalities, and instruct in home and community integration to attain remaining goals. [x]  See Plan of Care  []  See progress note/recertification  []  See Discharge Summary         Progress towards goals / Updated goals:  1. Pt will obtain 120 degrees of right knee flexion AROM in order to return to repetitive stair negotiation              PN: PROGRESSING, 114 degrees              Reassess NV  2. Pt will score 54 on FOTO in order to return to PLOF              PN: PROGRESSING, 30              Reassess NV  3. Pt will achieve 0 degrees of right knee extension AROM in order to normalize gait pattern              PN: PROGRESSING, lacking 3 degrees               Note a few degrees R knee flexion in standing activities versus full extension L.  4. Pt will score 5/5 on hip flexion MMT bilaterally in order to tolerate repetitive transfers              PN: NOT MET, 4-/5 with pain, left 4/5              Reassess NV  5.  Pt will report being able to descend 4 six inch steps with no feelings of her right LE dragging to improve gait quality and safety,               PN: NOT MET, Pt reports severe difficulty and has to negotiate sideways Reassess NV    PLAN  [x]  Upgrade activities as tolerated     [x]  Continue plan of care  []  Update interventions per flow sheet       []  Discharge due to:_  []  Other:_      Consuelo Ventura, PT 11/28/2022  11:29 AM    Future Appointments   Date Time Provider Lenny Gallegos   11/28/2022 12:00 PM [facility] PT [address] [facility]

## 2022-11-29 NOTE — PROGRESS NOTES
[facility] - [place] [address]  [address], [address] [phone] fax    PROGRESS NOTE    Patient name: Marcie Li Start of Care: 2022   Referral source: Saul Dwyer : 1960                Medical Diagnosis: Right knee pain [M25.561]  Payor: BLUE CROSS MEDICAID / Plan: [facility] HEALTHKEEPERS PLUS / Product Type: Managed Care Medicaid /  Onset Date:chronic conditon with recent exacerbations 07/10/2022                Treatment Diagnosis: right knee pain    Prior Hospitalization: see medical history Provider#: [id number]   Medications: Verified on Patient summary List    Comorbidities: Arthritis    Prior Level of Function: Able to tolerate prolonged standing/walking/working duties as a   Visits from [employer] of Care: 6                                      Missed Visits: 4     Established Goals:                     Excellent          Good               Limited            None  [] Increased ROM                             []                     []                     [x]                     []  [] Increased Strength                      []                     []                     [x]                     []  [] Increased Mobility                       []                     []                     [x]                     []           [] Decreased Pain                            []                     []                     [x]                     []  [] Decreased Swelling                     []                     []                     []                     []     Key Functional Changes:      Functional Gains: improved ambulation overall  Functional Deficits: decreased stair negotiation, increased stiffness, increased pain   % improvement: 40%  Pain   Today = 0/10 Pre- and Post-Treatment  Patient Goal: \"Climbing steps is terrible\"     1.  Pt will obtain 120 degrees of right knee flexion AROM in order to return to repetitive stair negotiation              PN: 107 degs              PN: PROGRESSING, 114 degrees  2. Pt will score 54 on FOTO in order to return to PLOF              PN: 25 points  PN: Not Reassessed, Was 30 at Re-Eval on 10/31/22  3. Pt will achieve 0 degrees of right knee extension AROM in order to normalize gait pattern              PN: lacking 6 degs              PN: PROGRESSING, lacking 2 to 3 degrees   4. Pt will score 5/5 on hip flexion MMT bilaterally in order to tolerate repetitive transfers              PN: right 4-/5 with pain, left 4/5              PN: Progressing, 4/5 bilat  5. Pt will report being able to descend 4 six inch steps with no feelings of her right LE dragging to improve gait quality and safety,               PN: has trouble descending stairs (feels like the leg is dragging)              PN: NOT Reassessed     Updated Goals: to be achieved in 4 weeks:                Continue above Goals. ASSESSMENT/RECOMMENDATIONS:     Pt reports 20% overall improvement since starting skilled PT. Pt reports increased ease with ambulation. However, pt is still severely limited with stairs, transfers for repetitive periods of times, and decreased tolerance to ADL's. Due to pt not being at her reported PLOF, pt would continue to benefit from ongoing skilled care to address remaining impairments listed above.       [x]Continue therapy per initial plan/protocol at a frequency of  1 x per week for 4 weeks  []Continue therapy with the following recommended changes:_____________________      _____________________________________________________________________  []Discontinue therapy progressing towards or have reached established goals  []Discontinue therapy due to lack of appreciable progress towards goals  []Discontinue therapy due to lack of attendance or compliance  []Await Physician's recommendations/decisions regarding therapy  []Other:________________________________________________________________    Thank you for this referral.    Cynthia Johnson, PT 11/29/2022 9:20 AM  NOTE TO PHYSICIAN:  PLEASE COMPLETE THE ORDERS BELOW AND   FAX TO [facility]: ([phone]  If you are unable to process this request in 24 hours please contact our office: [phone]    []  I have read the above report and request that my patient continue as recommended. []  I have read the above report and request that my patient continue therapy with the following changes/special instructions:________________________________________  [] I have read the above report and request that my patient be discharged from therapy.     Physician's Signature:____________Date:_________TIME:________     Laurance Phoenix, PA-C  ** Signature, Date and Time must be completed for valid certification **

## 2022-11-30 ENCOUNTER — VIRTUAL VISIT (OUTPATIENT)
Dept: FAMILY MEDICINE CLINIC | Age: 62
End: 2022-11-30
Payer: MEDICAID

## 2022-11-30 DIAGNOSIS — R05.1 ACUTE COUGH: Primary | ICD-10-CM

## 2022-11-30 PROCEDURE — 99442 PR PHYS/QHP TELEPHONE EVALUATION 11-20 MIN: CPT | Performed by: FAMILY MEDICINE

## 2022-11-30 RX ORDER — OSELTAMIVIR PHOSPHATE 75 MG/1
75 CAPSULE ORAL 2 TIMES DAILY
Qty: 10 CAPSULE | Refills: 0 | Status: SHIPPED | OUTPATIENT
Start: 2022-11-30 | End: 2022-12-05

## 2022-11-30 NOTE — LETTER
NOTIFICATION RETURN TO WORK / SCHOOL    11/30/2022 3:59 PM    Ms. Aashish Daily  [address] [facility]  [address] [place], [place] [address]  [phone]      To Whom It May Concern:    Aashish Daily is currently under the care of [address]. She will return to work on December 2, 2022. She was out 11/29 -12/1/22. If there are questions or concerns please have the patient contact our office.         Sincerely,      Hallie Gayle MD

## 2022-11-30 NOTE — PROGRESS NOTES
Chief Complaint   Patient presents with    Nasal Congestion    Abdominal Cramping    Diarrhea    Cough

## 2022-11-30 NOTE — PROGRESS NOTES
Chiara Joaquin is a 60 y.o. female, evaluated via audio-only technology on 11/30/2022 for Nasal Congestion, Abdominal Cramping, Diarrhea, and Cough  . she has had sxs for about 3 days. She has cough with fever. Assessment & Plan:   Diagnoses and all orders for this visit:    1. Acute cough  -     oseltamivir (TAMIFLU) 75 mg capsule; Take 1 Capsule by mouth two (2) times a day for 5 days. 12  Subjective:       Prior to Admission medications    Medication Sig Start Date End Date Taking? Authorizing Provider   diclofenac (VOLTAREN) 1 % gel Apply  to affected area four (4) times daily. 11/7/22  Yes Adelaide Velasquez MD   celecoxib (CELEBREX) 200 mg capsule Take 1 Capsule by mouth two (2) times a day for 90 days. 11/7/22 2/5/23 Yes Adelaide Velasquez MD   lidocaine (LIDODERM) 5 % Apply patch to the affected area for 12 hours a day and remove for 12 hours a day. 7/14/22  Yes Adelaide Velasquez MD   ergocalciferol (DrisdoL) 1,250 mcg (50,000 unit) capsule Take 1 Capsule by mouth every seven (7) days. 2/16/22  Yes Adelaide Velasquez MD     There is no problem list on file for this patient. Past Medical History:   Diagnosis Date    Arthritis     Hypercholesteremia     SOB (shortness of breath) on exertion        Review of Systems   Constitutional:  Negative for chills, fever, malaise/fatigue and weight loss. HENT:  Positive for congestion. Eyes:  Negative for blurred vision. Respiratory:  Positive for cough. Negative for shortness of breath and wheezing. Cardiovascular:  Negative for chest pain. Gastrointestinal:  Negative for nausea and vomiting. Musculoskeletal:  Negative for myalgias. Skin:  Negative for rash. Neurological:  Negative for weakness. No data recorded     Chiara Joaquin was evaluated through a patient-initiated, synchronous (real-time) audio only encounter.  She (or guardian if applicable) is aware that it is a billable service, which includes applicable co-pays, with coverage as determined by her insurance carrier. This visit was conducted with the patient's (and/or Nicole Mcghee guardian's) verbal consent. She has not had a related appointment within my department in the past 7 days or scheduled within the next 24 hours. The patient was located in a state where the provider was licensed to provide care. The patient was located at: Home: [address] [place], [place] [id number]  The provider was located at:  Facility (Appt Department): [address] [place] [address]  [phone]    Total Time: minutes: 11-20 minutes    Stephen Silveira MD

## 2022-12-12 ENCOUNTER — TELEPHONE (OUTPATIENT)
Dept: PHYSICAL THERAPY | Age: 62
End: 2022-12-12

## 2022-12-19 ENCOUNTER — TELEPHONE (OUTPATIENT)
Dept: PHYSICAL THERAPY | Age: 62
End: 2022-12-19

## 2023-01-09 ENCOUNTER — APPOINTMENT (OUTPATIENT)
Dept: PHYSICAL THERAPY | Age: 63
End: 2023-01-09
Attending: SPECIALIST

## 2023-01-25 ENCOUNTER — TELEPHONE (OUTPATIENT)
Dept: FAMILY MEDICINE CLINIC | Age: 63
End: 2023-01-25

## 2023-01-25 NOTE — TELEPHONE ENCOUNTER
----- Message from Rui Pierre sent at 1/24/2023  3:19 PM EST -----  Subject: Referral Request    Reason for referral request? PT called in to request x-rays. PT is having   pain in you right upper back blow the shoulder blade. Symptoms started   1/23/23. She is requesting x-ray orders and denied offer for booking an   apt. please follow up with PT. Provider patient wants to be referred to(if known):     Provider Phone Number(if known):     Additional Information for Provider?   ---------------------------------------------------------------------------  --------------  [facility]    [phone]; OK to leave message on voicemail  ---------------------------------------------------------------------------  --------------

## 2023-02-08 ENCOUNTER — OFFICE VISIT (OUTPATIENT)
Dept: FAMILY MEDICINE CLINIC | Age: 63
End: 2023-02-08
Payer: MEDICAID

## 2023-02-08 VITALS
SYSTOLIC BLOOD PRESSURE: 118 MMHG | HEART RATE: 55 BPM | OXYGEN SATURATION: 99 % | WEIGHT: 222 LBS | DIASTOLIC BLOOD PRESSURE: 72 MMHG | HEIGHT: 61 IN | BODY MASS INDEX: 41.91 KG/M2 | RESPIRATION RATE: 14 BRPM | TEMPERATURE: 97.8 F

## 2023-02-08 DIAGNOSIS — M54.9 UPPER BACK PAIN ON RIGHT SIDE: Primary | ICD-10-CM

## 2023-02-08 DIAGNOSIS — Z12.11 COLON CANCER SCREENING: ICD-10-CM

## 2023-02-08 PROCEDURE — 99213 OFFICE O/P EST LOW 20 MIN: CPT | Performed by: FAMILY MEDICINE

## 2023-02-08 RX ORDER — METHOCARBAMOL 500 MG/1
500 TABLET, FILM COATED ORAL 4 TIMES DAILY
Qty: 40 TABLET | Refills: 1 | Status: SHIPPED | OUTPATIENT
Start: 2023-02-08

## 2023-02-08 RX ORDER — MELOXICAM 15 MG/1
TABLET ORAL
COMMUNITY
Start: 2023-01-09

## 2023-02-08 NOTE — PROGRESS NOTES
Chief Complaint   Patient presents with    Back Pain       Patient here today to be seen for upper back pain on her right side she also has pain under right breast. She says she has had this pain x 2-3 weeks and has gotten worse. 1. \"Have you been to the ER, urgent care clinic since your last visit? Hospitalized since your last visit? \"  [facility] for upper back pain    2. \"Have you seen or consulted any other health care providers outside of the [address] [place] [address] since your last visit? \"  Has seen GYN      3. For patients aged 39-70: Has the patient had a colonoscopy / FIT/ Cologuard? Yes - no Care Gap present      If the patient is female:    4. For patients aged 41-77: Has the patient had a mammogram within the past 2 years? Yes - no Care Gap present      5. For patients aged 21-65: Has the patient had a pap smear? Yes - Care Gap present.  Rooming MA/LPN to request most recent results

## 2023-02-08 NOTE — LETTER
NOTIFICATION RETURN TO WORK     2/8/2023 1:56 PM    Ms. Kenroy Gracia  [address] [address]      To Whom It May Concern:    [facility] She will return to work/school on: Thursday 2/9/23. If there are questions or concerns please have the patient contact our office.         Sincerely,      Carol Matute MD

## 2023-02-08 NOTE — PROGRESS NOTES
Handy Varela is a 60 y.o. female  presents for right sided upper back pain. It is intermittent. She does a lot of heavy lifting at work. No weakness or numbness. Sxs are not getting worse. No Known Allergies  Outpatient Medications Marked as Taking for the 2/8/23 encounter (Office Visit) with Janny Chang MD   Medication Sig Dispense Refill    meloxicam (MOBIC) 15 mg tablet       diclofenac (VOLTAREN) 1 % gel Apply  to affected area four (4) times daily. 100 g 3    lidocaine (LIDODERM) 5 % Apply patch to the affected area for 12 hours a day and remove for 12 hours a day. 30 Each 3     There is no problem list on file for this patient. Past Medical History:   Diagnosis Date    Arthritis     Hypercholesteremia     SOB (shortness of breath) on exertion      Social History     Socioeconomic History    Marital status: SINGLE   Tobacco Use    Smoking status: Never    Smokeless tobacco: Never   Substance and Sexual Activity    Alcohol use: Never    Drug use: Never     No family history on file. Review of Systems   Constitutional:  Negative for chills, fever, malaise/fatigue and weight loss. Eyes:  Negative for blurred vision. Respiratory:  Negative for cough, shortness of breath and wheezing. Cardiovascular:  Negative for chest pain. Gastrointestinal:  Negative for nausea and vomiting. Musculoskeletal:  Positive for back pain. Negative for myalgias. Skin:  Negative for rash. Neurological:  Negative for weakness. Psychiatric/Behavioral: Negative. Vitals:    02/08/23 1342   BP: 118/72   Pulse: (!) 55   Resp: 14   Temp: 97.8 °F (36.6 °C)   TempSrc: Tympanic   SpO2: 99%   Weight: 222 lb (100.7 kg)   Height: 5' 0.5\" (1.537 m)   PainSc:  10 - Worst pain ever   PainLoc: Back       Physical Exam  Vitals and nursing note reviewed. Constitutional:       Appearance: Normal appearance. She is obese. Cardiovascular:      Rate and Rhythm: Normal rate and regular rhythm.       Heart sounds: Normal heart sounds. Pulmonary:      Effort: Pulmonary effort is normal.      Breath sounds: Normal breath sounds. Musculoskeletal:         General: Normal range of motion. Cervical back: Normal range of motion and neck supple. Skin:     General: Skin is warm and dry. Neurological:      General: No focal deficit present. Mental Status: She is alert and oriented to person, place, and time. Psychiatric:         Mood and Affect: Mood normal.         Behavior: Behavior normal.         Thought Content: Thought content normal.         Judgment: Judgment normal.       Assessment/Plan      ICD-10-CM ICD-9-CM    1. Upper back pain on right side  M54.9 724.5 methocarbamoL (ROBAXIN) 500 mg tablet      2. Colon cancer screening  Z12.11 V76.51 OCCULT BLOOD IMMUNOASSAY,DIAGNOSTIC        I have discussed the diagnosis with the patient and the intended plan of care as seen in the above orders. The patient has received an after-visit summary and questions were answered concerning future plans. I have discussed medication, side effects, and warnings with the patient in detail. The patient verbalized understanding and is in agreement with the plan of care. The patient will contact the office with any additional concerns.       lab results and schedule of future lab studies reviewed with patient    Jacquelin Gandhi MD

## 2023-02-14 ENCOUNTER — TELEPHONE (OUTPATIENT)
Facility: CLINIC | Age: 63
End: 2023-02-14

## 2023-02-14 DIAGNOSIS — M25.561 CHRONIC PAIN OF RIGHT KNEE: ICD-10-CM

## 2023-02-14 DIAGNOSIS — G89.29 CHRONIC PAIN OF RIGHT KNEE: ICD-10-CM

## 2023-02-14 DIAGNOSIS — M17.11 PRIMARY OSTEOARTHRITIS OF RIGHT KNEE: Primary | ICD-10-CM

## 2023-02-14 NOTE — TELEPHONE ENCOUNTER
----- Message from Alicia Loya sent at 2/9/2023 12:02 PM EST -----  Subject: Message to Provider    QUESTIONS  Information for Provider? Patient had an OV yesterday for back pain. She   missed work today due to the pain. She'd like a note excusing her absence   from work. Can you fax her employer? She says the fax # is in her file.   ---------------------------------------------------------------------------  --------------  Humberto Abdullahi [employer]  [phone]; OK to leave message on voicemail  ---------------------------------------------------------------------------  --------------  SCRIPT ANSWERS  Relationship to Patient?  Self R radial palpable +1/left normal

## 2023-02-14 NOTE — TELEPHONE ENCOUNTER
Called patient she says she still need letter faxed to her employer asked which dates did she miss she says she only needs this for 2/9. Letter has been faxed to [phone].

## 2023-03-07 ENCOUNTER — HOSPITAL ENCOUNTER (OUTPATIENT)
Facility: HOSPITAL | Age: 63
Setting detail: SPECIMEN
Discharge: HOME | End: 2023-03-10

## 2023-03-07 LAB — LABCORP SPECIMEN COLLECTION: NORMAL

## 2023-03-07 PROCEDURE — 99001 SPECIMEN HANDLING PT-LAB: CPT

## 2023-04-06 NOTE — PROGRESS NOTES
Called patient, was unable to leave a message voice-mail was full.  TS
Chief Complaint   Patient presents with    Knee Pain    Arthritis     C/o bilat knee pain, but pain worse on right. 10/10      Pt also states she take a \"little white pill for cholesterol\", but she doesn't remember the name of it.
Lc Gant is a 63 y.o. female, evaluated via audio-only technology on 4/5/2021 for Knee Pain (left is worse than right. she has tried naprosys without relief.  ) and Arthritis  . Assessment & Plan:   Diagnoses and all orders for this visit:    1. Chronic pain of both knees  -     REFERRAL TO ORTHOPEDICS  -     diclofenac EC (VOLTAREN) 50 mg EC tablet; Take 1 Tab by mouth two (2) times a day. 2. Encounter for screening mammogram for malignant neoplasm of breast  -     JYOTI MAMMO BI SCREENING INCL CAD; Future    3. Colon cancer screening  -     REFERRAL TO GASTROENTEROLOGY    4. Pain in both feet  -     REFERRAL TO PODIATRY        12  Subjective:       Prior to Admission medications    Medication Sig Start Date End Date Taking? Authorizing Provider   naproxen (NAPROSYN) 500 mg tablet  2/23/21   Provider, Historical     There is no problem list on file for this patient. Past Medical History:   Diagnosis Date    Arthritis     Hypercholesteremia     SOB (shortness of breath) on exertion        Review of Systems   Constitutional: Negative for chills and fever. Cardiovascular: Negative for chest pain and palpitations. Musculoskeletal: Positive for joint pain. Neurological: Negative. Psychiatric/Behavioral: Negative. No flowsheet data found. Lc Gant, who was evaluated through a patient-initiated, synchronous (real-time) audio only encounter, and/or her healthcare decision maker, is aware that it is a billable service, with coverage as determined by her insurance carrier. She provided verbal consent to proceed: Yes. She has not had a related appointment within my department in the past 7 days or scheduled within the next 24 hours.       Total Time: minutes: 21-30 minutes    Greg Mcdonald MD
98.5

## 2023-08-16 ENCOUNTER — TELEPHONE (OUTPATIENT)
Facility: CLINIC | Age: 63
End: 2023-08-16

## 2023-08-16 DIAGNOSIS — R20.2 TINGLING OF BOTH FEET: ICD-10-CM

## 2023-08-16 DIAGNOSIS — R05.1 ACUTE COUGH: Primary | ICD-10-CM

## 2023-08-16 NOTE — TELEPHONE ENCOUNTER
Patient called to request a call back directly from [address] [place] [address]. Informed patient that [address] [place] [address] was in patient care but I would route the message to him for his review. Please review and advise as needed.

## 2023-09-07 ENCOUNTER — OFFICE VISIT (OUTPATIENT)
Facility: CLINIC | Age: 63
End: 2023-09-07
Payer: COMMERCIAL

## 2023-09-07 VITALS
HEIGHT: 61 IN | WEIGHT: 224 LBS | SYSTOLIC BLOOD PRESSURE: 154 MMHG | DIASTOLIC BLOOD PRESSURE: 78 MMHG | HEART RATE: 71 BPM | OXYGEN SATURATION: 99 % | BODY MASS INDEX: 42.29 KG/M2 | TEMPERATURE: 97.5 F

## 2023-09-07 DIAGNOSIS — G89.29 CHRONIC PAIN OF LEFT KNEE: ICD-10-CM

## 2023-09-07 DIAGNOSIS — M25.561 CHRONIC PAIN OF RIGHT KNEE: ICD-10-CM

## 2023-09-07 DIAGNOSIS — I10 PRIMARY HYPERTENSION: Primary | ICD-10-CM

## 2023-09-07 DIAGNOSIS — Z23 FLU VACCINE NEED: ICD-10-CM

## 2023-09-07 DIAGNOSIS — R05.1 ACUTE COUGH: ICD-10-CM

## 2023-09-07 DIAGNOSIS — M25.562 CHRONIC PAIN OF LEFT KNEE: ICD-10-CM

## 2023-09-07 DIAGNOSIS — G89.29 CHRONIC PAIN OF RIGHT KNEE: ICD-10-CM

## 2023-09-07 PROCEDURE — 99213 OFFICE O/P EST LOW 20 MIN: CPT | Performed by: FAMILY MEDICINE

## 2023-09-07 PROCEDURE — 90674 CCIIV4 VAC NO PRSV 0.5 ML IM: CPT | Performed by: FAMILY MEDICINE

## 2023-09-07 PROCEDURE — 3077F SYST BP >= 140 MM HG: CPT | Performed by: FAMILY MEDICINE

## 2023-09-07 PROCEDURE — 3078F DIAST BP <80 MM HG: CPT | Performed by: FAMILY MEDICINE

## 2023-09-07 PROCEDURE — 90471 IMMUNIZATION ADMIN: CPT | Performed by: FAMILY MEDICINE

## 2023-09-07 RX ORDER — AMLODIPINE BESYLATE 5 MG/1
5 TABLET ORAL DAILY
Qty: 30 TABLET | Refills: 3 | Status: SHIPPED | OUTPATIENT
Start: 2023-09-07

## 2023-09-07 RX ORDER — AMOXICILLIN 250 MG
1 CAPSULE ORAL DAILY
COMMUNITY
Start: 2023-06-12

## 2023-09-07 RX ORDER — NYSTATIN 100000 [USP'U]/G
POWDER TOPICAL
COMMUNITY
Start: 2023-06-12

## 2023-09-07 RX ORDER — ROSUVASTATIN CALCIUM 10 MG/1
10 TABLET, COATED ORAL
COMMUNITY
Start: 2023-03-13

## 2023-09-07 RX ORDER — CYCLOBENZAPRINE HCL 5 MG
5 TABLET ORAL 3 TIMES DAILY
COMMUNITY
Start: 2023-01-24

## 2023-09-07 RX ORDER — ALBUTEROL SULFATE 90 UG/1
2 AEROSOL, METERED RESPIRATORY (INHALATION)
COMMUNITY
Start: 2023-03-13

## 2023-09-07 RX ORDER — OMEPRAZOLE 20 MG/1
20 CAPSULE, DELAYED RELEASE ORAL
COMMUNITY
Start: 2023-03-13

## 2023-09-07 RX ORDER — FEXOFENADINE HCL 180 MG/1
180 TABLET ORAL DAILY PRN
COMMUNITY
Start: 2023-03-13

## 2023-09-07 RX ORDER — AZITHROMYCIN 250 MG/1
250 TABLET, FILM COATED ORAL SEE ADMIN INSTRUCTIONS
Qty: 6 TABLET | Refills: 0 | Status: SHIPPED | OUTPATIENT
Start: 2023-09-07 | End: 2023-09-12

## 2023-09-07 RX ORDER — TRIAMCINOLONE ACETONIDE 1 MG/G
CREAM TOPICAL
COMMUNITY
Start: 2023-06-12

## 2023-09-07 RX ORDER — MAGNESIUM OXIDE 400 MG/1
400 TABLET ORAL DAILY
COMMUNITY
Start: 2021-08-17

## 2023-09-07 RX ORDER — BENZONATATE 200 MG/1
200 CAPSULE ORAL 3 TIMES DAILY
COMMUNITY
Start: 2023-01-09

## 2023-09-07 RX ORDER — POLYETHYLENE GLYCOL 3350
17 POWDER (GRAM) MISCELLANEOUS DAILY PRN
COMMUNITY
Start: 2023-03-13

## 2023-09-07 SDOH — ECONOMIC STABILITY: HOUSING INSECURITY
IN THE LAST 12 MONTHS, WAS THERE A TIME WHEN YOU DID NOT HAVE A STEADY PLACE TO SLEEP OR SLEPT IN A SHELTER (INCLUDING NOW)?: PATIENT REFUSED

## 2023-09-07 SDOH — ECONOMIC STABILITY: INCOME INSECURITY: HOW HARD IS IT FOR YOU TO PAY FOR THE VERY BASICS LIKE FOOD, HOUSING, MEDICAL CARE, AND HEATING?: PATIENT DECLINED

## 2023-09-07 SDOH — ECONOMIC STABILITY: FOOD INSECURITY: WITHIN THE PAST 12 MONTHS, THE FOOD YOU BOUGHT JUST DIDN'T LAST AND YOU DIDN'T HAVE MONEY TO GET MORE.: PATIENT DECLINED

## 2023-09-07 SDOH — ECONOMIC STABILITY: FOOD INSECURITY: WITHIN THE PAST 12 MONTHS, YOU WORRIED THAT YOUR FOOD WOULD RUN OUT BEFORE YOU GOT MONEY TO BUY MORE.: PATIENT DECLINED

## 2023-09-07 ASSESSMENT — PATIENT HEALTH QUESTIONNAIRE - PHQ9
SUM OF ALL RESPONSES TO PHQ QUESTIONS 1-9: 0
SUM OF ALL RESPONSES TO PHQ QUESTIONS 1-9: 0
2. FEELING DOWN, DEPRESSED OR HOPELESS: 0
1. LITTLE INTEREST OR PLEASURE IN DOING THINGS: 0
SUM OF ALL RESPONSES TO PHQ QUESTIONS 1-9: 0
SUM OF ALL RESPONSES TO PHQ9 QUESTIONS 1 & 2: 0
SUM OF ALL RESPONSES TO PHQ QUESTIONS 1-9: 0

## 2023-09-07 ASSESSMENT — ENCOUNTER SYMPTOMS
RESPIRATORY NEGATIVE: 1
COUGH: 0
VOMITING: 0
BACK PAIN: 1
NAUSEA: 0

## 2023-09-07 NOTE — PROGRESS NOTES
Kathy Miller is a 60 y.o. female  presents for   Chief Complaint   Patient presents with    Hypertension     6 month follow up. She says her head has been feeling funny and everything is blurry. She is on a blood pressure med but does not know name     Cough     Had a bad last week and now has cough that keeps her up at  night.         No Known Allergies    Current Outpatient Medications:     albuterol sulfate HFA (PROVENTIL;VENTOLIN;PROAIR) 108 (90 Base) MCG/ACT inhaler, Inhale 2 puffs into the lungs, Disp: , Rfl:     benzonatate (TESSALON) 200 MG capsule, Take 1 capsule by mouth 3 times daily, Disp: , Rfl:     cyclobenzaprine (FLEXERIL) 5 MG tablet, Take 1 tablet by mouth 3 times daily, Disp: , Rfl:     fexofenadine (ALLEGRA) 180 MG tablet, Take 1 tablet by mouth daily as needed, Disp: , Rfl:     hydrocortisone 2.5 % cream, Place rectally 2 times daily, Disp: , Rfl:     linaclotide (LINZESS) 290 MCG CAPS capsule, Take 1 capsule by mouth daily, Disp: , Rfl:     magnesium oxide (MAG-OX) 400 MG tablet, Take 1 tablet by mouth daily, Disp: , Rfl:     nystatin (MYCOSTATIN) 889703 UNIT/GM powder, apply topically to affected area twice a day if needed for rash (IN GROIN), Disp: , Rfl:     omeprazole (PRILOSEC) 20 MG delayed release capsule, Take 1 capsule by mouth every morning (before breakfast), Disp: , Rfl:     Polyethylene Glycol 3350 POWD, Take 17 g by mouth daily as needed, Disp: , Rfl:     rosuvastatin (CRESTOR) 10 MG tablet, Take 1 tablet by mouth, Disp: , Rfl:     senna-docusate (PERICOLACE) 8.6-50 MG per tablet, Take 1 tablet by mouth daily, Disp: , Rfl:     triamcinolone (KENALOG) 0.1 % cream, Use pea sized amount to affected area on back bid as needed, Disp: , Rfl:     diclofenac sodium (VOLTAREN) 1 % GEL, Apply topically 4 times daily, Disp: , Rfl:     ergocalciferol (ERGOCALCIFEROL) 1.25 MG (34085 UT) capsule, Take 1 capsule by mouth every 7 days, Disp: , Rfl:     lidocaine (LIDODERM) 5 %, Apply patch to

## 2023-09-13 RX ORDER — DOXYCYCLINE HYCLATE 100 MG
100 TABLET ORAL 2 TIMES DAILY
Qty: 10 TABLET | Refills: 0 | Status: SHIPPED | OUTPATIENT
Start: 2023-09-13 | End: 2023-09-18

## 2023-09-13 RX ORDER — TRAZODONE HYDROCHLORIDE 100 MG/1
100 TABLET ORAL NIGHTLY
Qty: 30 TABLET | Refills: 3 | Status: SHIPPED | OUTPATIENT
Start: 2023-09-13

## 2023-09-29 RX ORDER — LIDOCAINE 50 MG/G
PATCH TOPICAL
Qty: 30 PATCH | Refills: 3 | Status: SHIPPED | OUTPATIENT
Start: 2023-09-29

## 2023-10-10 ENCOUNTER — OFFICE VISIT (OUTPATIENT)
Facility: CLINIC | Age: 63
End: 2023-10-10
Payer: COMMERCIAL

## 2023-10-10 VITALS
HEART RATE: 68 BPM | OXYGEN SATURATION: 99 % | HEIGHT: 61 IN | SYSTOLIC BLOOD PRESSURE: 131 MMHG | WEIGHT: 221.4 LBS | BODY MASS INDEX: 41.8 KG/M2 | TEMPERATURE: 98.3 F | DIASTOLIC BLOOD PRESSURE: 74 MMHG

## 2023-10-10 DIAGNOSIS — M25.561 CHRONIC PAIN OF RIGHT KNEE: Primary | ICD-10-CM

## 2023-10-10 DIAGNOSIS — G89.29 CHRONIC PAIN OF RIGHT KNEE: Primary | ICD-10-CM

## 2023-10-10 PROCEDURE — 99213 OFFICE O/P EST LOW 20 MIN: CPT | Performed by: FAMILY MEDICINE

## 2023-10-10 RX ORDER — CELECOXIB 200 MG/1
200 CAPSULE ORAL DAILY
Qty: 60 CAPSULE | Refills: 3 | Status: SHIPPED | OUTPATIENT
Start: 2023-10-10

## 2023-10-10 ASSESSMENT — ENCOUNTER SYMPTOMS
NAUSEA: 0
RESPIRATORY NEGATIVE: 1
VOMITING: 0
COUGH: 0

## 2023-10-10 ASSESSMENT — PATIENT HEALTH QUESTIONNAIRE - PHQ9
SUM OF ALL RESPONSES TO PHQ9 QUESTIONS 1 & 2: 0
2. FEELING DOWN, DEPRESSED OR HOPELESS: 0
1. LITTLE INTEREST OR PLEASURE IN DOING THINGS: 0
SUM OF ALL RESPONSES TO PHQ QUESTIONS 1-9: 0

## 2023-10-10 NOTE — PROGRESS NOTES
[facility] 60 y.o. female  presents for   Chief Complaint   Patient presents with    Hypertension        No Known Allergies    Current Outpatient Medications:     lidocaine (LIDODERM) 5 %, APPLY 1  PATCH TO THE AFFECTED AREA FOR 12 HOURS A DAY AND REMOVE FOR 12 HOURS, Disp: 30 patch, Rfl: 3    traZODone (DESYREL) 100 MG tablet, Take 1 tablet by mouth nightly, Disp: 30 tablet, Rfl: 3    albuterol sulfate HFA (PROVENTIL;VENTOLIN;PROAIR) 108 (90 Base) MCG/ACT inhaler, Inhale 2 puffs into the lungs, Disp: , Rfl:     benzonatate (TESSALON) 200 MG capsule, Take 1 capsule by mouth 3 times daily, Disp: , Rfl:     cyclobenzaprine (FLEXERIL) 5 MG tablet, Take 1 tablet by mouth 3 times daily, Disp: , Rfl:     fexofenadine (ALLEGRA) 180 MG tablet, Take 1 tablet by mouth daily as needed, Disp: , Rfl:     hydrocortisone 2.5 % cream, Place rectally 2 times daily, Disp: , Rfl:     linaclotide (LINZESS) 290 MCG CAPS capsule, Take 1 capsule by mouth daily, Disp: , Rfl:     magnesium oxide (MAG-OX) 400 MG tablet, Take 1 tablet by mouth daily, Disp: , Rfl:     nystatin (MYCOSTATIN) 978815 UNIT/GM powder, apply topically to affected area twice a day if needed for rash (IN GROIN), Disp: , Rfl:     omeprazole (PRILOSEC) 20 MG delayed release capsule, Take 1 capsule by mouth every morning (before breakfast), Disp: , Rfl:     Polyethylene Glycol 3350 POWD, Take 17 g by mouth daily as needed, Disp: , Rfl:     rosuvastatin (CRESTOR) 10 MG tablet, Take 1 tablet by mouth, Disp: , Rfl:     senna-docusate (PERICOLACE) 8.6-50 MG per tablet, Take 1 tablet by mouth daily, Disp: , Rfl:     triamcinolone (KENALOG) 0.1 % cream, Use pea sized amount to affected area on back bid as needed, Disp: , Rfl:     amLODIPine (NORVASC) 5 MG tablet, Take 1 tablet by mouth daily, Disp: 30 tablet, Rfl: 3    diclofenac sodium (VOLTAREN) 1 % GEL, Apply topically 4 times daily, Disp: , Rfl:     ergocalciferol (ERGOCALCIFEROL) 1.25 MG (54556 UT) capsule, Take 1

## 2023-11-08 ENCOUNTER — OFFICE VISIT (OUTPATIENT)
Facility: CLINIC | Age: 63
End: 2023-11-08
Payer: COMMERCIAL

## 2023-11-08 ENCOUNTER — TELEPHONE (OUTPATIENT)
Facility: CLINIC | Age: 63
End: 2023-11-08

## 2023-11-08 VITALS — WEIGHT: 215 LBS | HEIGHT: 61 IN | BODY MASS INDEX: 40.59 KG/M2

## 2023-11-08 DIAGNOSIS — T63.444A BEE STING, UNDETERMINED INTENT, INITIAL ENCOUNTER: Primary | ICD-10-CM

## 2023-11-08 PROCEDURE — 99443 PR PHYS/QHP TELEPHONE EVALUATION 21-30 MIN: CPT | Performed by: FAMILY MEDICINE

## 2023-11-08 RX ORDER — PREDNISONE 10 MG/1
TABLET ORAL
Qty: 21 EACH | Refills: 0 | Status: SHIPPED | OUTPATIENT
Start: 2023-11-08

## 2023-11-08 RX ORDER — DOXYCYCLINE HYCLATE 100 MG
100 TABLET ORAL 2 TIMES DAILY
Qty: 10 TABLET | Refills: 0 | Status: SHIPPED | OUTPATIENT
Start: 2023-11-08 | End: 2023-11-13

## 2023-11-08 ASSESSMENT — PATIENT HEALTH QUESTIONNAIRE - PHQ9
SUM OF ALL RESPONSES TO PHQ QUESTIONS 1-9: 0
SUM OF ALL RESPONSES TO PHQ QUESTIONS 1-9: 0
SUM OF ALL RESPONSES TO PHQ9 QUESTIONS 1 & 2: 0
2. FEELING DOWN, DEPRESSED OR HOPELESS: 0
1. LITTLE INTEREST OR PLEASURE IN DOING THINGS: 0
SUM OF ALL RESPONSES TO PHQ QUESTIONS 1-9: 0
SUM OF ALL RESPONSES TO PHQ QUESTIONS 1-9: 0

## 2023-11-08 NOTE — PROGRESS NOTES
Idania Oconnor is a 60 y.o. female evaluated via telephone on 11/8/2023 for Insect Bite (Sunday was stung by multiple bees, states that they stung her all the way up her right leg. )  . Assessment & Plan   1. Bee sting, undetermined intent, initial encounter  -     predniSONE 10 MG (21) TBPK; Use as directed, Disp-21 each, R-0Normal  -     doxycycline hyclate (VIBRA-TABS) 100 MG tablet; Take 1 tablet by mouth 2 times daily for 5 days, Disp-10 tablet, R-0Normal    No follow-ups on file. Subjective     HPI    Review of Systems      Objective   Patient-Reported Vitals  No data recorded       Total Time: minutes: 21-30 minutes     Idania Oconnor was evaluated through a synchronous (real-time) audio only encounter. Patient identification was verified at the start of the visit. She (or guardian if applicable) is aware that this is a billable service, which includes applicable co-pays. This visit was conducted with patient's (and/or legal guardian's) verbal consent. She has not had a related appointment within my department in the past 7 days or scheduled within the next 24 hours. The patient was located at Home: [address] [place] [id number]. The provider was located at [facility] (Appt Dept): [address]  [address] [facility],  [address] [place] [address].      Onesimo Celeste MD

## 2023-11-08 NOTE — TELEPHONE ENCOUNTER
Pt called to speak to , informed pt that he is currently in patient care at the moment. Pt would like a call back directly from [address] [place] [address]. Please review and advise as soon as possible.

## 2023-12-28 ENCOUNTER — TELEPHONE (OUTPATIENT)
Facility: CLINIC | Age: 63
End: 2023-12-28

## 2023-12-28 DIAGNOSIS — M79.673 PAIN OF FOOT, UNSPECIFIED LATERALITY: Primary | ICD-10-CM

## 2023-12-28 NOTE — TELEPHONE ENCOUNTER
Pt called in regards to a conversation she had with Amy on 12/27. Pt would like a updated referral for podiatry as the current facility does not accept her insurance. Please review and advise as soon as possible.

## 2024-02-29 ENCOUNTER — OFFICE VISIT (OUTPATIENT)
Facility: CLINIC | Age: 64
End: 2024-02-29

## 2024-02-29 DIAGNOSIS — R05.1 ACUTE COUGH: Primary | ICD-10-CM

## 2024-02-29 PROCEDURE — 99442 PR PHYS/QHP TELEPHONE EVALUATION 11-20 MIN: CPT | Performed by: FAMILY MEDICINE

## 2024-02-29 RX ORDER — AZITHROMYCIN 250 MG/1
TABLET, FILM COATED ORAL
Qty: 6 TABLET | Refills: 0 | Status: SHIPPED | OUTPATIENT
Start: 2024-02-29 | End: 2024-03-10

## 2024-02-29 RX ORDER — CODEINE PHOSPHATE/GUAIFENESIN 10-100MG/5
5 LIQUID (ML) ORAL 3 TIMES DAILY PRN
Qty: 118 ML | Refills: 0 | Status: SHIPPED | OUTPATIENT
Start: 2024-02-29 | End: 2024-03-14

## 2024-02-29 ASSESSMENT — ENCOUNTER SYMPTOMS
CHEST TIGHTNESS: 0
COUGH: 1
WHEEZING: 0
SHORTNESS OF BREATH: 0

## 2024-02-29 NOTE — PROGRESS NOTES
Hui Michael is a 63 y.o. female evaluated via telephone on 2/29/2024 for Cough  .      Assessment & Plan   1. Acute cough  -     azithromycin (ZITHROMAX) 250 MG tablet; 500mg on day 1 followed by 250mg on days 2 - 5, Disp-6 tablet, R-0Normal  -     guaiFENesin-codeine (TUSSI-ORGANIDIN NR) 100-10 MG/5ML syrup; Take 5 mLs by mouth 3 times daily as needed for Cough for up to 14 days. Max Daily Amount: 15 mLs, Disp-118 mL, R-0Normal    No follow-ups on file.      Subjective   Prior to Visit Medications    Medication Sig Taking? Authorizing Provider   azithromycin (ZITHROMAX) 250 MG tablet 500mg on day 1 followed by 250mg on days 2 - 5 Yes Naveen Lazo MD   guaiFENesin-codeine (TUSSI-ORGANIDIN NR) 100-10 MG/5ML syrup Take 5 mLs by mouth 3 times daily as needed for Cough for up to 14 days. Max Daily Amount: 15 mLs Yes Naveen Lazo MD   sucralfate (CARAFATE) 1 GM tablet Take 1 tablet by mouth 4 times daily  Naveen Lazo MD   predniSONE 10 MG (21) TBPK Use as directed  Naveen Lazo MD   celecoxib (CELEBREX) 200 MG capsule Take 1 capsule by mouth daily  Naveen Lazo MD   lidocaine (LIDODERM) 5 % APPLY 1  PATCH TO THE AFFECTED AREA FOR 12 HOURS A DAY AND REMOVE FOR 12 HOURS  Naveen Lazo MD   traZODone (DESYREL) 100 MG tablet Take 1 tablet by mouth nightly  Naveen Lazo MD   albuterol sulfate HFA (PROVENTIL;VENTOLIN;PROAIR) 108 (90 Base) MCG/ACT inhaler Inhale 2 puffs into the lungs  Shirley Carlisle MD   benzonatate (TESSALON) 200 MG capsule Take 1 capsule by mouth 3 times daily  Shirley Carlisle MD   cyclobenzaprine (FLEXERIL) 5 MG tablet Take 1 tablet by mouth 3 times daily  Shirley Carlisle MD   fexofenadine (ALLEGRA) 180 MG tablet Take 1 tablet by mouth daily as needed  Shirley Carlisle MD   hydrocortisone 2.5 % cream Place rectally 2 times daily  Shirley Carlisle MD   linaclotide (LINZESS) 290 MCG CAPS capsule Take 1 capsule by mouth daily  Provider, MD Shirley

## 2024-08-20 ENCOUNTER — COMMUNITY OUTREACH (OUTPATIENT)
Facility: CLINIC | Age: 64
End: 2024-08-20

## 2024-08-20 NOTE — PROGRESS NOTES
Patient's HM shows they are overdue for Mammogram.  Care Everywhere and  files searched.  No results to attach to order nor HM updated.    HM updated with most recent mammogram, not due.

## 2024-12-30 ENCOUNTER — OFFICE VISIT (OUTPATIENT)
Facility: CLINIC | Age: 64
End: 2024-12-30

## 2024-12-30 VITALS
HEART RATE: 55 BPM | TEMPERATURE: 97.8 F | HEIGHT: 61 IN | WEIGHT: 215 LBS | DIASTOLIC BLOOD PRESSURE: 62 MMHG | BODY MASS INDEX: 40.59 KG/M2 | OXYGEN SATURATION: 98 % | SYSTOLIC BLOOD PRESSURE: 133 MMHG

## 2024-12-30 DIAGNOSIS — H61.21 HEARING LOSS DUE TO CERUMEN IMPACTION, RIGHT: ICD-10-CM

## 2024-12-30 DIAGNOSIS — R05.1 ACUTE COUGH: Primary | ICD-10-CM

## 2024-12-30 LAB
EXP DATE SOLUTION: NORMAL
EXP DATE SWAB: NORMAL
EXPIRATION DATE: NORMAL
INFLUENZA A ANTIGEN, POC: NEGATIVE
INFLUENZA B ANTIGEN, POC: NEGATIVE
LOT NUMBER POC: NORMAL
LOT NUMBER SOLUTION: NORMAL
LOT NUMBER SWAB: NORMAL
SARS-COV-2 RNA, POC: NEGATIVE
VALID INTERNAL CONTROL, POC: YES

## 2024-12-30 SDOH — ECONOMIC STABILITY: INCOME INSECURITY: HOW HARD IS IT FOR YOU TO PAY FOR THE VERY BASICS LIKE FOOD, HOUSING, MEDICAL CARE, AND HEATING?: NOT HARD AT ALL

## 2024-12-30 SDOH — ECONOMIC STABILITY: FOOD INSECURITY: WITHIN THE PAST 12 MONTHS, YOU WORRIED THAT YOUR FOOD WOULD RUN OUT BEFORE YOU GOT MONEY TO BUY MORE.: NEVER TRUE

## 2024-12-30 SDOH — ECONOMIC STABILITY: FOOD INSECURITY: WITHIN THE PAST 12 MONTHS, THE FOOD YOU BOUGHT JUST DIDN'T LAST AND YOU DIDN'T HAVE MONEY TO GET MORE.: NEVER TRUE

## 2024-12-30 ASSESSMENT — PATIENT HEALTH QUESTIONNAIRE - PHQ9
SUM OF ALL RESPONSES TO PHQ QUESTIONS 1-9: 0
SUM OF ALL RESPONSES TO PHQ QUESTIONS 1-9: 0
1. LITTLE INTEREST OR PLEASURE IN DOING THINGS: NOT AT ALL
SUM OF ALL RESPONSES TO PHQ QUESTIONS 1-9: 0
SUM OF ALL RESPONSES TO PHQ QUESTIONS 1-9: 0
2. FEELING DOWN, DEPRESSED OR HOPELESS: NOT AT ALL
SUM OF ALL RESPONSES TO PHQ9 QUESTIONS 1 & 2: 0

## 2024-12-30 NOTE — PROGRESS NOTES
Chief Complaint   Patient presents with    Cough     Patient states sx started on last Friday    Headache    Vomiting     \"Have you been to the ER, urgent care clinic since your last visit?  Hospitalized since your last visit?\"    NO    “Have you seen or consulted any other health care providers outside our system since your last visit?”    NO     “Have you had a pap smear?”    NO    No cervical cancer screening on file       “Have you had a colorectal cancer screening such as a colonoscopy/FIT/Cologuard?    NO    No colonoscopy on file  No cologuard on file  Date of last FIT: 3/7/2023   No flexible sigmoidoscopy on file            
impaction    The R ear demonstrated cerumen impaction on otoscopic exam. Irrigation was performed. All cerumen was removed. Hearing improved after cerumen removal. Pt tolerated procedure well with minimal discomfort.            Objective   Blood pressure 133/62, pulse 55, temperature 97.8 °F (36.6 °C), temperature source Oral, height 1.537 m (5' 0.5\"), weight 97.5 kg (215 lb), SpO2 98%.Body mass index is 41.3 kg/m².  General appearance - Alert, NAD, normal appearance  Head - Atraumatic. Normocephalic.   Eyes - EOMI. Sclera white.   Ears - Hearing grossly decreased. R cerumen impaction.     Respiratory - LCTAB. Effort normal, without respiratory distress. No wheeze/rale/rhonchi  Heart - Normal rate, regular rhythm. No m/r/r  Neurological - No gross focal deficits. Speech normal. Alert and oriented. Mental status is at baseline.   Musculoskeletal - Grossly normal ROM, Gait normal.    Skin - normal coloration and normal turgor.          Medical History- Reviewed Social History- Reviewed Surgical History- Reviewed   Hui has a past medical history of Arthritis, Hypercholesteremia, and SOB (shortness of breath) on exertion. Hui reports that she has never smoked. She has never used smokeless tobacco. She reports that she does not drink alcohol and does not use drugs. Hui has a past surgical history that includes exploratory of abdomen.         Problem List- Reviewed   Hui does not have a problem list on file.       Current Outpatient Medications   Medication Instructions    albuterol sulfate HFA (PROVENTIL;VENTOLIN;PROAIR) 108 (90 Base) MCG/ACT inhaler 2 puffs, Inhalation    amLODIPine (NORVASC) 5 mg, Oral, DAILY    benzonatate (TESSALON) 200 mg, Oral, 3 TIMES DAILY    celecoxib (CELEBREX) 200 mg, Oral, DAILY    cyclobenzaprine (FLEXERIL) 5 mg, Oral, 3 TIMES DAILY    diclofenac sodium (VOLTAREN) 1 % GEL Topical, 4 TIMES DAILY    ergocalciferol (ERGOCALCIFEROL) 50,000 Units, Oral, EVERY 7 DAYS

## 2025-05-23 NOTE — PROGRESS NOTES
Have you been to the ER, urgent care clinic since your last visit?  Hospitalized since your last visit?   NO    Have you seen or consulted any other health care providers outside our system since your last visit?   YES - When: approximately 7 months ago.  Where and Why: [facility].     “Have you had a pap smear?”    NO  Unknown date per pt, has this done at the [facility], can no longer have this done there due to age  No cervical cancer screening on file       “Have you had a colorectal cancer screening such as a colonoscopy/FIT/Cologuard?    NO    No colonoscopy on file  No cologuard on file  Date of last FIT: 3/7/2023   No flexible sigmoidoscopy on file

## 2025-05-28 ENCOUNTER — OFFICE VISIT (OUTPATIENT)
Facility: CLINIC | Age: 65
End: 2025-05-28
Payer: MEDICARE

## 2025-05-28 VITALS
HEART RATE: 67 BPM | OXYGEN SATURATION: 99 % | BODY MASS INDEX: 42.07 KG/M2 | TEMPERATURE: 97.4 F | SYSTOLIC BLOOD PRESSURE: 122 MMHG | DIASTOLIC BLOOD PRESSURE: 80 MMHG | WEIGHT: 219 LBS

## 2025-05-28 DIAGNOSIS — G89.29 CHRONIC PAIN OF LEFT KNEE: ICD-10-CM

## 2025-05-28 DIAGNOSIS — M25.562 CHRONIC PAIN OF LEFT KNEE: ICD-10-CM

## 2025-05-28 DIAGNOSIS — Z12.11 COLON CANCER SCREENING: ICD-10-CM

## 2025-05-28 DIAGNOSIS — K52.9 ACUTE GASTROENTERITIS: Primary | ICD-10-CM

## 2025-05-28 PROCEDURE — 99214 OFFICE O/P EST MOD 30 MIN: CPT | Performed by: FAMILY MEDICINE

## 2025-05-28 PROCEDURE — 1123F ACP DISCUSS/DSCN MKR DOCD: CPT | Performed by: FAMILY MEDICINE

## 2025-05-28 RX ORDER — OMEPRAZOLE 40 MG/1
40 CAPSULE, DELAYED RELEASE ORAL
Qty: 30 CAPSULE | Refills: 5 | Status: SHIPPED | OUTPATIENT
Start: 2025-05-28

## 2025-05-28 ASSESSMENT — ENCOUNTER SYMPTOMS
RESPIRATORY NEGATIVE: 1
COUGH: 0
NAUSEA: 0
VOMITING: 0

## 2025-05-28 NOTE — PROGRESS NOTES
Hui Michael is a 64 y.o. female  presents for   Chief Complaint   Patient presents with    Headache     Resolved after advil     Abdominal Pain     Pain around belly button after eating x 2 weeks. Denies nausea or diarrhea, fever, or chills.      Numbness     Reports numbness in the bottom of both feet for \"a while\"    Other     Would like a referral for a pap        No Known Allergies    Current Outpatient Medications:     diclofenac sodium (VOLTAREN) 1 % GEL, Apply topically 4 times daily, Disp: 100 g, Rfl: 3    omeprazole (PRILOSEC) 40 MG delayed release capsule, Take 1 capsule by mouth every morning (before breakfast), Disp: 30 capsule, Rfl: 5    sucralfate (CARAFATE) 1 GM tablet, Take 1 tablet by mouth 4 times daily, Disp: 60 tablet, Rfl: 1    celecoxib (CELEBREX) 200 MG capsule, Take 1 capsule by mouth daily, Disp: 60 capsule, Rfl: 3    lidocaine (LIDODERM) 5 %, APPLY 1  PATCH TO THE AFFECTED AREA FOR 12 HOURS A DAY AND REMOVE FOR 12 HOURS, Disp: 30 patch, Rfl: 3    traZODone (DESYREL) 100 MG tablet, Take 1 tablet by mouth nightly, Disp: 30 tablet, Rfl: 3    albuterol sulfate HFA (PROVENTIL;VENTOLIN;PROAIR) 108 (90 Base) MCG/ACT inhaler, Inhale 2 puffs into the lungs, Disp: , Rfl:     cyclobenzaprine (FLEXERIL) 5 MG tablet, Take 1 tablet by mouth 3 times daily, Disp: , Rfl:     fexofenadine (ALLEGRA) 180 MG tablet, Take 1 tablet by mouth daily as needed, Disp: , Rfl:     hydrocortisone 2.5 % cream, Place rectally 2 times daily, Disp: , Rfl:     linaclotide (LINZESS) 290 MCG CAPS capsule, Take 1 capsule by mouth daily, Disp: , Rfl:     magnesium oxide (MAG-OX) 400 MG tablet, Take 1 tablet by mouth daily, Disp: , Rfl:     nystatin (MYCOSTATIN) 725736 UNIT/GM powder, apply topically to affected area twice a day if needed for rash (IN GROIN), Disp: , Rfl:     Polyethylene Glycol 3350 POWD, Take 17 g by mouth daily as needed, Disp: , Rfl:     rosuvastatin (CRESTOR) 10 MG tablet, Take 1 tablet by mouth, Disp: ,

## 2025-06-05 ENCOUNTER — COMMUNITY OUTREACH (OUTPATIENT)
Facility: CLINIC | Age: 65
End: 2025-06-05

## 2025-06-19 ENCOUNTER — OFFICE VISIT (OUTPATIENT)
Facility: CLINIC | Age: 65
End: 2025-06-19
Payer: MEDICARE

## 2025-06-19 VITALS
WEIGHT: 216.8 LBS | BODY MASS INDEX: 41.64 KG/M2 | DIASTOLIC BLOOD PRESSURE: 70 MMHG | TEMPERATURE: 98 F | HEART RATE: 78 BPM | SYSTOLIC BLOOD PRESSURE: 130 MMHG | RESPIRATION RATE: 16 BRPM | OXYGEN SATURATION: 97 %

## 2025-06-19 DIAGNOSIS — B96.89 ACUTE BACTERIAL BRONCHITIS: Primary | ICD-10-CM

## 2025-06-19 DIAGNOSIS — J20.8 ACUTE BACTERIAL BRONCHITIS: Primary | ICD-10-CM

## 2025-06-19 PROCEDURE — 99213 OFFICE O/P EST LOW 20 MIN: CPT | Performed by: STUDENT IN AN ORGANIZED HEALTH CARE EDUCATION/TRAINING PROGRAM

## 2025-06-19 PROCEDURE — 1123F ACP DISCUSS/DSCN MKR DOCD: CPT | Performed by: STUDENT IN AN ORGANIZED HEALTH CARE EDUCATION/TRAINING PROGRAM

## 2025-06-19 RX ORDER — BENZONATATE 200 MG/1
200 CAPSULE ORAL 3 TIMES DAILY PRN
Qty: 30 CAPSULE | Refills: 0 | Status: SHIPPED | OUTPATIENT
Start: 2025-06-19 | End: 2025-06-29

## 2025-06-19 SDOH — ECONOMIC STABILITY: FOOD INSECURITY: WITHIN THE PAST 12 MONTHS, YOU WORRIED THAT YOUR FOOD WOULD RUN OUT BEFORE YOU GOT MONEY TO BUY MORE.: NEVER TRUE

## 2025-06-19 SDOH — ECONOMIC STABILITY: FOOD INSECURITY: WITHIN THE PAST 12 MONTHS, THE FOOD YOU BOUGHT JUST DIDN'T LAST AND YOU DIDN'T HAVE MONEY TO GET MORE.: NEVER TRUE

## 2025-06-19 ASSESSMENT — PATIENT HEALTH QUESTIONNAIRE - PHQ9
SUM OF ALL RESPONSES TO PHQ QUESTIONS 1-9: 0
2. FEELING DOWN, DEPRESSED OR HOPELESS: NOT AT ALL
1. LITTLE INTEREST OR PLEASURE IN DOING THINGS: NOT AT ALL

## 2025-06-19 NOTE — PROGRESS NOTES
Have you been to the ER, urgent care clinic since your last visit?  Hospitalized since your last visit?   NO    Have you seen or consulted any other health care providers outside our system since your last visit?   NO     “Have you had a pap smear?”    NO    No cervical cancer screening on file       “Have you had a colorectal cancer screening such as a colonoscopy/FIT/Cologuard?    NO    No colonoscopy on file  No cologuard on file  Date of last FIT: 3/7/2023   No flexible sigmoidoscopy on file

## 2025-06-19 NOTE — PROGRESS NOTES
Hui Michael (: 1960) is a 64 y.o. female, established patient, here for evaluation of the following chief complaint(s):  Cough (Patient states she has been having a cough since last Friday, no fever, no sore throat.)        Assessment & Plan  1. URI: Persistent.  - Worsened over the past two days, especially at night.  - Possible bacterial infection given duration and still worsening.  - Prescribed Augmentin 875 mg/125 mg BID for one week.  - Prescribed Tessalon Perles for nighttime cough relief.  - Advised hydration and rest.  - Issued work note due to contagious illness.    Follow-up  - Work note issued for illness.    Results    1. Acute bacterial bronchitis  -     amoxicillin-clavulanate (AUGMENTIN) 875-125 MG per tablet; Take 1 tablet by mouth 2 times daily for 7 days, Disp-14 tablet, R-0Normal  -     benzonatate (TESSALON) 200 MG capsule; Take 1 capsule by mouth 3 times daily as needed for Cough, Disp-30 capsule, R-0Normal    Return if symptoms worsen or fail to improve.         Subjective   History of Present Illness  Patient presents with a persistent cough since Friday, disrupting sleep, especially at night. Cough worsened over the past two days. No known exposure to sick individuals; attended a high school graduation event before symptom onset. History of low immunity. No shortness of breath or ear pain. Appetite and hydration unaffected. NyQuil ineffective; has not used Tessalon Perles.    SOCIAL HISTORY  - Works in  at a plant           Objective   Blood pressure 130/70, pulse 78, temperature 98 °F (36.7 °C), temperature source Tympanic, resp. rate 16, weight 98.3 kg (216 lb 12.8 oz), SpO2 97%.Body mass index is 41.64 kg/m².  General appearance - Alert, NAD, normal appearance  Head - Atraumatic. Normocephalic. No lymphadenopathy  Eyes - EOMI. Sclera white.   Ears - Hearing grossly normal. TMs without erythema or bulge bilaterally. No cerumen impaction.   Nose - Nares patent,

## 2025-07-31 ENCOUNTER — OFFICE VISIT (OUTPATIENT)
Facility: CLINIC | Age: 65
End: 2025-07-31
Payer: MEDICARE

## 2025-07-31 VITALS
HEART RATE: 77 BPM | SYSTOLIC BLOOD PRESSURE: 120 MMHG | DIASTOLIC BLOOD PRESSURE: 82 MMHG | WEIGHT: 216 LBS | OXYGEN SATURATION: 98 % | BODY MASS INDEX: 41.49 KG/M2

## 2025-07-31 DIAGNOSIS — Z01.419 ENCOUNTER FOR GYNECOLOGICAL EXAMINATION WITHOUT ABNORMAL FINDING: ICD-10-CM

## 2025-07-31 DIAGNOSIS — Z12.31 ENCOUNTER FOR SCREENING MAMMOGRAM FOR MALIGNANT NEOPLASM OF BREAST: ICD-10-CM

## 2025-07-31 DIAGNOSIS — G89.29 CHRONIC PAIN OF LEFT KNEE: Primary | ICD-10-CM

## 2025-07-31 DIAGNOSIS — M25.562 CHRONIC PAIN OF LEFT KNEE: Primary | ICD-10-CM

## 2025-07-31 DIAGNOSIS — Z12.11 COLON CANCER SCREENING: ICD-10-CM

## 2025-07-31 PROCEDURE — 1123F ACP DISCUSS/DSCN MKR DOCD: CPT | Performed by: FAMILY MEDICINE

## 2025-07-31 PROCEDURE — 99213 OFFICE O/P EST LOW 20 MIN: CPT | Performed by: FAMILY MEDICINE

## 2025-07-31 SDOH — ECONOMIC STABILITY: FOOD INSECURITY: WITHIN THE PAST 12 MONTHS, YOU WORRIED THAT YOUR FOOD WOULD RUN OUT BEFORE YOU GOT MONEY TO BUY MORE.: NEVER TRUE

## 2025-07-31 SDOH — ECONOMIC STABILITY: FOOD INSECURITY: WITHIN THE PAST 12 MONTHS, THE FOOD YOU BOUGHT JUST DIDN'T LAST AND YOU DIDN'T HAVE MONEY TO GET MORE.: NEVER TRUE

## 2025-07-31 ASSESSMENT — PATIENT HEALTH QUESTIONNAIRE - PHQ9
SUM OF ALL RESPONSES TO PHQ QUESTIONS 1-9: 0
2. FEELING DOWN, DEPRESSED OR HOPELESS: NOT AT ALL
1. LITTLE INTEREST OR PLEASURE IN DOING THINGS: NOT AT ALL
SUM OF ALL RESPONSES TO PHQ QUESTIONS 1-9: 0

## 2025-07-31 ASSESSMENT — ENCOUNTER SYMPTOMS
COUGH: 0
VOMITING: 0
RESPIRATORY NEGATIVE: 1
NAUSEA: 0

## 2025-07-31 NOTE — PATIENT INSTRUCTIONS
[facility] Transportation Resources*  (Call [employer]/211 if need more resources.)        [employer] [facility]  What they offer: [employer] [facility] [employer] offers fixed routes, medical transportation, and on-demand response transit for those age 60+.  Accepts donations for regular service.  Fare: Approximately $4.00 each way  Phone Number: 514.667.4051  Website: https://www.[employer]    [facility] Paratransit  What they offer: In compliance with the American Disabilities Act, [facility] Transit provides a shared ride, advanced reservation, origin to destination service for disabled individuals who are unable to use regular fixed route public transportation services because of their disabilities.   Phone Number: [phone]  Apply online: [email or web address].[email or web address]/TransitAgencyChoice.[email or web address]   [facility] Transit  Services offered:  is the primary public transportation provider in the [facility], serving cities such as [place], [facility], [facility], and [place]  Bus Routes and [employer]  [phone]  [email or web address]          Medicare Advantage Plans  Some plans may provide transportation. Members should contact the Member Services or Transportation number on the back of their insurance card for more information or to schedule transportation.    Medicaid Plans - [facility] ([facility]) Plus     Each health plan has options for transportation services. Contact your insurance provider to schedule transportation. Transportation or Member Services contact information is listed on the back of the insurance card.     Insurance Contacts     o [facility]   Phone: [phone]   Website:  [email or web address]    o [facility] [facility]   Phone: 1-851.643.7066  Website: [email or web address]/[email or web address]      o [employer]   Phone:

## 2025-07-31 NOTE — PROGRESS NOTES
Have you been to the ER, urgent care clinic since your last visit?  Hospitalized since your last visit?   NO    Have you seen or consulted any other health care providers outside our system since your last visit?   NO     “Have you had a pap smear?”    NO    No cervical cancer screening on file       “Have you had a colorectal cancer screening such as a colonoscopy/FIT/Cologuard?    NO    No colonoscopy on file  No cologuard on file  Date of last FIT: 3/7/2023   No flexible sigmoidoscopy on file         
breath) on exertion      Social History     Socioeconomic History    Marital status: Single     Spouse name: None    Number of children: None    Years of education: None    Highest education level: None   Tobacco Use    Smoking status: Never    Smokeless tobacco: Never   Substance and Sexual Activity    Alcohol use: Never    Drug use: Never     Social Drivers of Health     Financial Resource Strain: Low Risk  (12/30/2024)    Overall Financial Resource Strain (CARDIA)     Difficulty of Paying Living Expenses: Not hard at all   Food Insecurity: No Food Insecurity (7/31/2025)    Hunger Vital Sign     Worried About Running Out of Food in the Last Year: Never true     Ran Out of Food in the Last Year: Never true   Transportation Needs: Unmet Transportation Needs (7/31/2025)    PRAPARE - Transportation     Lack of Transportation (Medical): Yes     Lack of Transportation (Non-Medical): Yes   Housing Stability: Low Risk  (7/31/2025)    Housing Stability Vital Sign     Unable to Pay for Housing in the Last Year: No     Number of Times Moved in the Last Year: 0     Homeless in the Last Year: No     No family history on file.     Review of Systems   Constitutional: Negative.  Negative for fever.   Respiratory: Negative.  Negative for cough.    Cardiovascular:  Negative for chest pain.   Gastrointestinal:  Negative for nausea and vomiting.   Neurological: Negative.  Negative for weakness.   Psychiatric/Behavioral: Negative.  Negative for behavioral problems and suicidal ideas.         Vitals:    07/31/25 1347   BP: 120/82   Pulse: 77   SpO2: 98%        Physical Exam  Vitals and nursing note reviewed.   Constitutional:       Appearance: Normal appearance. She is obese.   Cardiovascular:      Rate and Rhythm: Normal rate and regular rhythm.      Pulses: Normal pulses.      Heart sounds: Normal heart sounds. No murmur heard.  Pulmonary:      Effort: Pulmonary effort is normal.      Breath sounds: Normal breath sounds.   Skin:

## 2025-08-14 ENCOUNTER — TELEPHONE (OUTPATIENT)
Facility: CLINIC | Age: 65
End: 2025-08-14

## 2025-08-14 DIAGNOSIS — Z01.419 ENCOUNTER FOR GYNECOLOGICAL EXAMINATION WITHOUT ABNORMAL FINDING: Primary | ICD-10-CM
